# Patient Record
Sex: MALE
[De-identification: names, ages, dates, MRNs, and addresses within clinical notes are randomized per-mention and may not be internally consistent; named-entity substitution may affect disease eponyms.]

---

## 2017-01-24 ENCOUNTER — PRIOR ORIGINAL RECORDS (OUTPATIENT)
Dept: OTHER | Age: 57
End: 2017-01-24

## 2017-01-24 ENCOUNTER — APPOINTMENT (OUTPATIENT)
Dept: GENERAL RADIOLOGY | Facility: HOSPITAL | Age: 57
End: 2017-01-24
Attending: EMERGENCY MEDICINE
Payer: COMMERCIAL

## 2017-01-24 ENCOUNTER — HOSPITAL ENCOUNTER (EMERGENCY)
Facility: HOSPITAL | Age: 57
Discharge: HOME | End: 2017-01-24
Attending: EMERGENCY MEDICINE
Payer: COMMERCIAL

## 2017-01-24 VITALS
DIASTOLIC BLOOD PRESSURE: 59 MMHG | BODY MASS INDEX: 26.68 KG/M2 | SYSTOLIC BLOOD PRESSURE: 102 MMHG | HEART RATE: 70 BPM | OXYGEN SATURATION: 99 % | HEIGHT: 67 IN | WEIGHT: 170 LBS | RESPIRATION RATE: 16 BRPM | TEMPERATURE: 98 F

## 2017-01-24 DIAGNOSIS — I47.1 SVT (SUPRAVENTRICULAR TACHYCARDIA) (HCC): Primary | ICD-10-CM

## 2017-01-24 LAB
ANION GAP SERPL CALC-SCNC: 10 MMOL/L (ref 0–18)
BASOPHILS # BLD: 0 K/UL (ref 0–0.2)
BASOPHILS NFR BLD: 0 %
BUN SERPL-MCNC: 21 MG/DL (ref 8–20)
BUN/CREAT SERPL: 19.3 (ref 10–20)
CALCIUM SERPL-MCNC: 8.8 MG/DL (ref 8.5–10.5)
CHLORIDE SERPL-SCNC: 106 MMOL/L (ref 95–110)
CHOLEST SERPL-MCNC: 216 MG/DL (ref 110–200)
CO2 SERPL-SCNC: 23 MMOL/L (ref 22–32)
CREAT SERPL-MCNC: 1.09 MG/DL (ref 0.5–1.5)
EOSINOPHIL # BLD: 0.4 K/UL (ref 0–0.7)
EOSINOPHIL NFR BLD: 3 %
ERYTHROCYTE [DISTWIDTH] IN BLOOD BY AUTOMATED COUNT: 13.2 % (ref 11–15)
GLUCOSE SERPL-MCNC: 126 MG/DL (ref 70–99)
HCT VFR BLD AUTO: 48 % (ref 41–52)
HDLC SERPL-MCNC: 39 MG/DL
HGB BLD-MCNC: 16 G/DL (ref 13.5–17.5)
LDLC SERPL CALC-MCNC: 117 MG/DL (ref 0–99)
LYMPHOCYTES # BLD: 7.9 K/UL (ref 1–4)
LYMPHOCYTES NFR BLD: 61 %
MCH RBC QN AUTO: 30.2 PG (ref 27–32)
MCHC RBC AUTO-ENTMCNC: 33.3 G/DL (ref 32–37)
MCV RBC AUTO: 90.7 FL (ref 80–100)
MONOCYTES # BLD: 0.5 K/UL (ref 0–1)
MONOCYTES NFR BLD: 4 %
NEUTROPHILS # BLD AUTO: 3.2 K/UL (ref 1.8–7.7)
NEUTROPHILS NFR BLD: 22 %
NEUTS BAND NFR BLD: 5 %
NONHDLC SERPL-MCNC: 177 MG/DL
OSMOLALITY UR CALC.SUM OF ELEC: 293 MOSM/KG (ref 275–295)
PLATELET # BLD AUTO: 232 K/UL (ref 140–400)
PMV BLD AUTO: 10.3 FL (ref 7.4–10.3)
POTASSIUM SERPL-SCNC: 3.9 MMOL/L (ref 3.3–5.1)
RBC # BLD AUTO: 5.29 M/UL (ref 4.5–5.9)
SODIUM SERPL-SCNC: 139 MMOL/L (ref 136–144)
TRIGL SERPL-MCNC: 299 MG/DL (ref 1–149)
TROPONIN I SERPL-MCNC: 0.04 NG/ML (ref ?–0.03)
VARIANT LYMPHS NFR BLD MANUAL: 5 %
WBC # BLD AUTO: 12 K/UL (ref 4–11)

## 2017-01-24 PROCEDURE — 80048 BASIC METABOLIC PNL TOTAL CA: CPT | Performed by: EMERGENCY MEDICINE

## 2017-01-24 PROCEDURE — 71010 XR CHEST AP PORTABLE  (CPT=71010): CPT

## 2017-01-24 PROCEDURE — 85025 COMPLETE CBC W/AUTO DIFF WBC: CPT | Performed by: EMERGENCY MEDICINE

## 2017-01-24 PROCEDURE — 93010 ELECTROCARDIOGRAM REPORT: CPT | Performed by: EMERGENCY MEDICINE

## 2017-01-24 PROCEDURE — 85060 BLOOD SMEAR INTERPRETATION: CPT | Performed by: EMERGENCY MEDICINE

## 2017-01-24 PROCEDURE — 96374 THER/PROPH/DIAG INJ IV PUSH: CPT

## 2017-01-24 PROCEDURE — 99285 EMERGENCY DEPT VISIT HI MDM: CPT

## 2017-01-24 PROCEDURE — 93005 ELECTROCARDIOGRAM TRACING: CPT

## 2017-01-24 PROCEDURE — 84484 ASSAY OF TROPONIN QUANT: CPT | Performed by: EMERGENCY MEDICINE

## 2017-01-24 PROCEDURE — 80061 LIPID PANEL: CPT | Performed by: EMERGENCY MEDICINE

## 2017-01-24 RX ORDER — ADENOSINE 3 MG/ML
INJECTION, SOLUTION INTRAVENOUS
Status: DISCONTINUED
Start: 2017-01-24 | End: 2017-01-24 | Stop reason: WASHOUT

## 2017-01-24 RX ORDER — ADENOSINE 3 MG/ML
6 INJECTION, SOLUTION INTRAVENOUS ONCE
Status: DISCONTINUED | OUTPATIENT
Start: 2017-01-24 | End: 2017-01-24

## 2017-01-24 RX ORDER — ADENOSINE 3 MG/ML
INJECTION, SOLUTION INTRAVENOUS
Status: DISCONTINUED
Start: 2017-01-24 | End: 2017-01-24

## 2017-01-24 RX ORDER — ADENOSINE 3 MG/ML
6 INJECTION, SOLUTION INTRAVENOUS ONCE
Status: COMPLETED | OUTPATIENT
Start: 2017-01-24 | End: 2017-01-24

## 2017-01-24 NOTE — ED PROVIDER NOTES
Patient Seen in: [facility] AND [facility] Emergency Department    History   Patient presents with:  Chest Pressure      HPI    The patient presents with his family complaining of chest pressure and palpitations starting at 7:30 PM tonight.   Patient states he ha and negative except as noted above. PSFH elements reviewed from today and agreed except as otherwise stated in HPI.     Physical Exam       ED Triage Vitals   BP 01/24/17 0257 131/88 mmHg   Pulse 01/24/17 0257 168   Resp 01/24/17 0257 20   Temp 01/24/17 PLATELET.   Procedure                               Abnormality         Status                     ---------                               -----------         ------                     CBC W/ DIFFERENTIAL[[id number]]          Abnormal            Preliminary caregiver.       Disposition and Plan     Clinical Impression:  SVT (supraventricular tachycardia) (HCC)  (primary encounter diagnosis)    Disposition:  Discharge    Follow-up:  Cristi Duke MD  [address] [facility] [address] [phone]    [place]

## 2017-03-13 ENCOUNTER — TELEPHONE (OUTPATIENT)
Dept: CARDIOLOGY CLINIC | Facility: CLINIC | Age: 57
End: 2017-03-13

## 2017-03-13 NOTE — TELEPHONE ENCOUNTER
Pt was scheduled to see Dr. Dennis Elder on Wednesday.  Pt was also advised he may need to make appt with Dr. Lashawn Gutierrez, per ER discharge

## 2017-03-13 NOTE — TELEPHONE ENCOUNTER
Pt daughter called. Pt was seen in ER 3 weeks ago for heart palpitations and had another episode of  heart palpitations last week. Pt daughter asked if pt can be sooner then 04/17.   Please call thank you

## 2017-03-14 NOTE — TELEPHONE ENCOUNTER
S/w [facility] office and pt was made an appt with Dr. Gabi Killian today, but daughter states pt is unable to make appt. Pt would prefer to to see Dr. Kenya Eldridge. Daughter made an appt to see Dr. Kenya Eldridge next week.

## 2017-03-20 LAB
CALCIUM: 8.8 MG/DL
CHLORIDE: 106 MEQ/L
CHOLESTEROL, TOTAL: 216 MG/DL
CREATININE, SERUM: 1.09 MG/DL
GLUCOSE: 126 MG/DL
GLUCOSE: 126 MG/DL
HDL CHOLESTEROL: 39 MG/DL
HEMATOCRIT: 48 %
HEMOGLOBIN: 16 G/DL
LDL CHOLESTEROL: 117 MG/DL
MCH: 30.2 PG
MCHC: 33.3 G/DL
MCV: 90.7 FL
NON-HDL CHOLESTEROL: 177 MG/DL
PLATELETS: 232 K/UL
POTASSIUM, SERUM: 3.9 MEQ/L
RED BLOOD COUNT: 5.29 X 10-6/U
SODIUM: 139 MEQ/L
TRIGLYCERIDES: 299 MG/DL
URIC ACID: 21 MG/DL
WHITE BLOOD COUNT: 12 X 10-3/U

## 2017-03-21 ENCOUNTER — MYAURORA ACCOUNT LINK (OUTPATIENT)
Dept: OTHER | Age: 57
End: 2017-03-21

## 2017-03-21 ENCOUNTER — PRIOR ORIGINAL RECORDS (OUTPATIENT)
Dept: OTHER | Age: 57
End: 2017-03-21

## 2017-04-24 PROBLEM — E78.5 DYSLIPIDEMIA: Status: ACTIVE | Noted: 2017-04-24

## 2017-04-24 PROBLEM — I47.10 PSVT (PAROXYSMAL SUPRAVENTRICULAR TACHYCARDIA) (HCC): Status: ACTIVE | Noted: 2017-04-24

## 2017-04-24 PROBLEM — I47.10 PSVT (PAROXYSMAL SUPRAVENTRICULAR TACHYCARDIA): Status: ACTIVE | Noted: 2017-04-24

## 2017-04-24 PROBLEM — I47.1 PSVT (PAROXYSMAL SUPRAVENTRICULAR TACHYCARDIA) (HCC): Status: ACTIVE | Noted: 2017-04-24

## 2017-08-04 ENCOUNTER — HOSPITAL ENCOUNTER (EMERGENCY)
Facility: HOSPITAL | Age: 57
Discharge: HOME | End: 2017-08-04
Attending: EMERGENCY MEDICINE
Payer: COMMERCIAL

## 2017-08-04 VITALS
SYSTOLIC BLOOD PRESSURE: 108 MMHG | WEIGHT: 170 LBS | DIASTOLIC BLOOD PRESSURE: 73 MMHG | BODY MASS INDEX: 24.34 KG/M2 | HEART RATE: 62 BPM | HEIGHT: 70 IN | RESPIRATION RATE: 16 BRPM | OXYGEN SATURATION: 98 % | TEMPERATURE: 99 F

## 2017-08-04 DIAGNOSIS — I47.1 PSVT (PAROXYSMAL SUPRAVENTRICULAR TACHYCARDIA) (HCC): Primary | ICD-10-CM

## 2017-08-04 LAB
ANION GAP SERPL CALC-SCNC: 10 MMOL/L (ref 0–18)
BASOPHILS # BLD: 0.1 K/UL (ref 0–0.2)
BASOPHILS NFR BLD: 1 %
BUN SERPL-MCNC: 20 MG/DL (ref 8–20)
BUN/CREAT SERPL: 18.7 (ref 10–20)
CALCIUM SERPL-MCNC: 9.1 MG/DL (ref 8.5–10.5)
CHLORIDE SERPL-SCNC: 103 MMOL/L (ref 95–110)
CO2 SERPL-SCNC: 27 MMOL/L (ref 22–32)
CREAT SERPL-MCNC: 1.07 MG/DL (ref 0.5–1.5)
EOSINOPHIL # BLD: 0.2 K/UL (ref 0–0.7)
EOSINOPHIL NFR BLD: 1 %
ERYTHROCYTE [DISTWIDTH] IN BLOOD BY AUTOMATED COUNT: 13.2 % (ref 11–15)
GLUCOSE SERPL-MCNC: 113 MG/DL (ref 70–99)
HCT VFR BLD AUTO: 50.4 % (ref 41–52)
HGB BLD-MCNC: 16.9 G/DL (ref 13.5–17.5)
LYMPHOCYTES # BLD: 6.3 K/UL (ref 1–4)
LYMPHOCYTES NFR BLD: 51 %
MAGNESIUM SERPL-MCNC: 2.2 MG/DL (ref 1.8–2.5)
MCH RBC QN AUTO: 30.3 PG (ref 27–32)
MCHC RBC AUTO-ENTMCNC: 33.5 G/DL (ref 32–37)
MCV RBC AUTO: 90.2 FL (ref 80–100)
MONOCYTES # BLD: 0.9 K/UL (ref 0–1)
MONOCYTES NFR BLD: 7 %
NEUTROPHILS # BLD AUTO: 4.9 K/UL (ref 1.8–7.7)
NEUTROPHILS NFR BLD: 40 %
OSMOLALITY UR CALC.SUM OF ELEC: 293 MOSM/KG (ref 275–295)
PLATELET # BLD AUTO: 257 K/UL (ref 140–400)
PMV BLD AUTO: 9.3 FL (ref 7.4–10.3)
POTASSIUM SERPL-SCNC: 3.7 MMOL/L (ref 3.3–5.1)
RBC # BLD AUTO: 5.58 M/UL (ref 4.5–5.9)
SODIUM SERPL-SCNC: 140 MMOL/L (ref 136–144)
TROPONIN I SERPL-MCNC: 0.03 NG/ML (ref ?–0.03)
WBC # BLD AUTO: 12.3 K/UL (ref 4–11)

## 2017-08-04 PROCEDURE — 83735 ASSAY OF MAGNESIUM: CPT | Performed by: EMERGENCY MEDICINE

## 2017-08-04 PROCEDURE — 93010 ELECTROCARDIOGRAM REPORT: CPT | Performed by: EMERGENCY MEDICINE

## 2017-08-04 PROCEDURE — 80048 BASIC METABOLIC PNL TOTAL CA: CPT | Performed by: EMERGENCY MEDICINE

## 2017-08-04 PROCEDURE — 93005 ELECTROCARDIOGRAM TRACING: CPT

## 2017-08-04 PROCEDURE — 99284 EMERGENCY DEPT VISIT MOD MDM: CPT

## 2017-08-04 PROCEDURE — 85025 COMPLETE CBC W/AUTO DIFF WBC: CPT | Performed by: EMERGENCY MEDICINE

## 2017-08-04 PROCEDURE — 84484 ASSAY OF TROPONIN QUANT: CPT | Performed by: EMERGENCY MEDICINE

## 2017-08-04 PROCEDURE — 96374 THER/PROPH/DIAG INJ IV PUSH: CPT

## 2017-08-04 PROCEDURE — 85007 BL SMEAR W/DIFF WBC COUNT: CPT | Performed by: EMERGENCY MEDICINE

## 2017-08-04 PROCEDURE — 85060 BLOOD SMEAR INTERPRETATION: CPT | Performed by: EMERGENCY MEDICINE

## 2017-08-04 PROCEDURE — 85027 COMPLETE CBC AUTOMATED: CPT | Performed by: EMERGENCY MEDICINE

## 2017-08-04 RX ORDER — DILTIAZEM HYDROCHLORIDE 120 MG/1
120 CAPSULE, COATED, EXTENDED RELEASE ORAL DAILY
Qty: 14 CAPSULE | Refills: 0 | Status: SHIPPED | OUTPATIENT
Start: 2017-08-04 | End: 2017-08-18

## 2017-08-04 RX ORDER — ADENOSINE 3 MG/ML
INJECTION, SOLUTION INTRAVENOUS
Status: COMPLETED
Start: 2017-08-04 | End: 2017-08-04

## 2017-08-04 RX ORDER — ADENOSINE 3 MG/ML
6 INJECTION, SOLUTION INTRAVENOUS ONCE
Status: COMPLETED | OUTPATIENT
Start: 2017-08-04 | End: 2017-08-04

## 2017-08-04 NOTE — ED NOTES
Pt resting comfortably to cart with family at bedside. Denies any complaints of pain presently. Pt in sinus rhythm. See VITALS please. No concerns. Remains to monitor.

## 2017-08-04 NOTE — ED PROVIDER NOTES
Patient Seen in: [facility] AND [facility] Emergency Department    History   Patient presents with:  Chest Pain Angina (cardiovascular)    Stated Complaint: chest pain    HPI    79-year-old male presents for evaluation of palpitations.   Patient reports palpitati Alcohol use: No               Comment: 1 beer, monthly      Review of Systems    Positive for stated complaint: chest pain  Other systems are as noted in HPI. Constitutional and vital signs reviewed.       All other systems reviewed and negative except a TROPONIN I, 0 HOUR - Normal   MAGNESIUM - Normal   CBC WITH DIFFERENTIAL WITH PLATELET    Narrative: The following orders were created for panel order CBC WITH DIFFERENTIAL WITH PLATELET.   Procedure                               Abnormality         S up      Medications Prescribed:  Discharge Medication List as of 8/4/2017  3:57 AM    START taking these medications    !! DilTIAZem HCl ER Coated Beads (CARDIZEM CD) 120 MG Oral Capsule SR 24 Hr  Take 1 capsule (120 mg total) by mouth daily. , Normal, Disp

## 2017-08-04 NOTE — ED NOTES
Presents to ED in SVT with hr of 150's states felt palpitations today. Hx of svt. 6mg of adenocard given, patient converted into nsr with a rate of 80-90's. States he feels much better. Otherwise alert oriented without deficit.

## 2017-11-18 ENCOUNTER — LAB ENCOUNTER (OUTPATIENT)
Dept: LAB | Facility: HOSPITAL | Age: 57
End: 2017-11-18
Attending: INTERNAL MEDICINE
Payer: COMMERCIAL

## 2017-11-18 DIAGNOSIS — I47.1 PSVT (PAROXYSMAL SUPRAVENTRICULAR TACHYCARDIA) (HCC): ICD-10-CM

## 2017-11-18 DIAGNOSIS — Z01.812 PRE-PROCEDURE LAB EXAM: ICD-10-CM

## 2017-11-18 PROCEDURE — 80048 BASIC METABOLIC PNL TOTAL CA: CPT

## 2017-11-18 PROCEDURE — 36415 COLL VENOUS BLD VENIPUNCTURE: CPT

## 2017-11-18 PROCEDURE — 85025 COMPLETE CBC W/AUTO DIFF WBC: CPT

## 2017-11-24 RX ORDER — SODIUM CHLORIDE 9 MG/ML
INJECTION, SOLUTION INTRAVENOUS ONCE
Status: COMPLETED | OUTPATIENT
Start: 2017-11-27 | End: 2017-11-27

## 2017-11-27 ENCOUNTER — HOSPITAL ENCOUNTER (OUTPATIENT)
Dept: INTERVENTIONAL RADIOLOGY/VASCULAR | Facility: HOSPITAL | Age: 57
Discharge: HOME | End: 2017-11-27
Attending: INTERNAL MEDICINE | Admitting: INTERNAL MEDICINE
Payer: COMMERCIAL

## 2017-11-27 VITALS
OXYGEN SATURATION: 99 % | HEART RATE: 62 BPM | WEIGHT: 181 LBS | BODY MASS INDEX: 28 KG/M2 | RESPIRATION RATE: 16 BRPM | DIASTOLIC BLOOD PRESSURE: 70 MMHG | SYSTOLIC BLOOD PRESSURE: 104 MMHG

## 2017-11-27 DIAGNOSIS — I47.1 SVT (SUPRAVENTRICULAR TACHYCARDIA) (HCC): ICD-10-CM

## 2017-11-27 PROCEDURE — 93623 PRGRMD STIMJ&PACG IV RX NFS: CPT

## 2017-11-27 PROCEDURE — 99152 MOD SED SAME PHYS/QHP 5/>YRS: CPT

## 2017-11-27 PROCEDURE — 93005 ELECTROCARDIOGRAM TRACING: CPT

## 2017-11-27 PROCEDURE — 93010 ELECTROCARDIOGRAM REPORT: CPT | Performed by: INTERNAL MEDICINE

## 2017-11-27 PROCEDURE — 02583ZZ DESTRUCTION OF CONDUCTION MECHANISM, PERCUTANEOUS APPROACH: ICD-10-PCS | Performed by: INTERNAL MEDICINE

## 2017-11-27 PROCEDURE — 99153 MOD SED SAME PHYS/QHP EA: CPT

## 2017-11-27 PROCEDURE — 93653 COMPRE EP EVAL TX SVT: CPT

## 2017-11-27 PROCEDURE — 02K83ZZ MAP CONDUCTION MECHANISM, PERCUTANEOUS APPROACH: ICD-10-PCS | Performed by: INTERNAL MEDICINE

## 2017-11-27 PROCEDURE — 93621 COMP EP EVL L PAC&REC C SINS: CPT

## 2017-11-27 PROCEDURE — 93609 INTRA-VNTR MAPG TCHYCAR SITE: CPT

## 2017-11-27 RX ORDER — SODIUM CHLORIDE 9 MG/ML
INJECTION, SOLUTION INTRAVENOUS
Status: COMPLETED
Start: 2017-11-27 | End: 2017-11-27

## 2017-11-27 RX ORDER — ISOPROTERENOL HYDROCHLORIDE 0.2 MG/ML
INJECTION, SOLUTION INTRAMUSCULAR; INTRAVENOUS
Status: COMPLETED
Start: 2017-11-27 | End: 2017-11-27

## 2017-11-27 RX ORDER — MIDAZOLAM HYDROCHLORIDE 1 MG/ML
INJECTION INTRAMUSCULAR; INTRAVENOUS
Status: COMPLETED
Start: 2017-11-27 | End: 2017-11-27

## 2017-11-27 RX ORDER — DIPHENHYDRAMINE HYDROCHLORIDE 50 MG/ML
INJECTION INTRAMUSCULAR; INTRAVENOUS
Status: COMPLETED
Start: 2017-11-27 | End: 2017-11-27

## 2017-11-27 RX ORDER — LIDOCAINE HYDROCHLORIDE 20 MG/ML
INJECTION, SOLUTION EPIDURAL; INFILTRATION; INTRACAUDAL; PERINEURAL
Status: COMPLETED
Start: 2017-11-27 | End: 2017-11-27

## 2017-11-27 RX ORDER — SODIUM CHLORIDE 9 MG/ML
INJECTION, SOLUTION INTRAVENOUS
Status: DISCONTINUED
Start: 2017-11-27 | End: 2017-11-27

## 2017-11-27 RX ADMIN — SODIUM CHLORIDE: 9 INJECTION, SOLUTION INTRAVENOUS at 12:45:00

## 2017-11-27 NOTE — PROCEDURES
[facility]    PATIENT'S NAME: Kierra Montez   ATTENDING PHYSICIAN: Esteban John. Devon Richardson MD   OPERATING PHYSICIAN: Esteban John.  Devon Richardson MD   PATIENT ACCOUNT#:   [id number]    LOCATION:  [place] [address]  MEDICAL RECORD #:   [id number]       D conduction was 370. It was concentric with decremental properties. No evidence of a left-sided pathway. TACHYCARDIA DATA:  Tachycardia was extremely difficult to be induced.   We had one 5-second run of SVT and one 4-beat run of SVT, both consistent wi

## 2017-11-28 NOTE — PROGRESS NOTES
Adelina Francisco  [id number]  11/27/2017    Pt is able to sit up and ambulate without difficulty. Pt voided and tolerated fluids/food. Pt's vital signs are stable. Procedural sites remain dry and intact with good circulation, motion and sensation.  No sign

## 2018-04-06 ENCOUNTER — OFFICE VISIT (OUTPATIENT)
Dept: INTERNAL MEDICINE CLINIC | Facility: CLINIC | Age: 58
End: 2018-04-06

## 2018-04-06 VITALS
BODY MASS INDEX: 27.6 KG/M2 | SYSTOLIC BLOOD PRESSURE: 121 MMHG | HEIGHT: 67.75 IN | WEIGHT: 180 LBS | TEMPERATURE: 98 F | HEART RATE: 62 BPM | DIASTOLIC BLOOD PRESSURE: 67 MMHG | RESPIRATION RATE: 18 BRPM

## 2018-04-06 DIAGNOSIS — Z00.00 PHYSICAL EXAM, ANNUAL: Primary | ICD-10-CM

## 2018-04-06 DIAGNOSIS — Z12.5 PROSTATE CANCER SCREENING: ICD-10-CM

## 2018-04-06 PROCEDURE — 99396 PREV VISIT EST AGE 40-64: CPT | Performed by: INTERNAL MEDICINE

## 2018-04-06 RX ORDER — CODEINE PHOSPHATE AND GUAIFENESIN 10; 100 MG/5ML; MG/5ML
5 SOLUTION ORAL EVERY 6 HOURS PRN
Qty: 180 ML | Refills: 0 | Status: SHIPPED | OUTPATIENT
Start: 2018-04-06 | End: 2018-07-12

## 2018-04-06 RX ORDER — CEFDINIR 300 MG/1
300 CAPSULE ORAL 2 TIMES DAILY
Qty: 14 CAPSULE | Refills: 0 | Status: SHIPPED | OUTPATIENT
Start: 2018-04-06 | End: 2018-07-12

## 2018-04-08 NOTE — PROGRESS NOTES
HPI:    Patient ID: Jaden Gibson is a 61year old male. Presents for  a physical exam.    HPI  Patient reports that last 2 weeks he has been bothered by dry cough, nasal congestion, cough keeps him awake at night.   He says that his symptoms he is fee m)   Wt 180 lb (81.6 kg)   BMI 27.57 kg/m²    Physical Exam    Constitutional: He is oriented to person, place, and time. He appears well-developed and well-nourished. No distress. HENT:   Head: Normocephalic and atraumatic.    Mouth/Throat: Oropharynx is

## 2018-05-08 ENCOUNTER — TELEPHONE (OUTPATIENT)
Dept: OTHER | Age: 58
End: 2018-05-08

## 2018-05-08 DIAGNOSIS — R79.89 ABNORMAL CBC: Primary | ICD-10-CM

## 2018-05-08 NOTE — TELEPHONE ENCOUNTER
Spoke with patient (identified name and ), results reviewed and agrees with plan.   Lab ordered and mailed to home as requested    Notes recorded by Kwan Cantu MD on 2018 at 10:15 PM CDT  Please call patient that blood count shows elevated lymph

## 2018-11-23 ENCOUNTER — NURSE TRIAGE (OUTPATIENT)
Dept: INTERNAL MEDICINE CLINIC | Facility: CLINIC | Age: 58
End: 2018-11-23

## 2018-11-23 NOTE — TELEPHONE ENCOUNTER
Action Requested: Summary for Provider     []  Critical Lab, Recommendations Needed  [] Need Additional Advice  [x]   FYI    []   Need Orders  [] Need Medications Sent to Pharmacy  []  Other     SUMMARY: Appt scheduled for 11/26/18 3:40pm with cliff Bocanegra o

## 2018-11-23 NOTE — TELEPHONE ENCOUNTER
Please call pt I adivse that he  Goes to IC  For eval, jose  May become incarcerated and  requieres    emergency surgery

## 2018-11-24 NOTE — TELEPHONE ENCOUNTER
Irina returned call, did not seek tx, pain is improved today. States pain is in groin area, not in penis or scrotum. Gets worse with straining or exertiion. Deos not wish to go to IC at this time.   Will keep appt as scheduled Monday, will report to I

## 2018-11-26 ENCOUNTER — HOSPITAL ENCOUNTER (OUTPATIENT)
Dept: CT IMAGING | Facility: HOSPITAL | Age: 58
Discharge: HOME | End: 2018-11-26
Attending: INTERNAL MEDICINE
Payer: COMMERCIAL

## 2018-11-26 ENCOUNTER — TELEPHONE (OUTPATIENT)
Dept: INTERNAL MEDICINE CLINIC | Facility: CLINIC | Age: 58
End: 2018-11-26

## 2018-11-26 ENCOUNTER — TELEPHONE (OUTPATIENT)
Dept: OTHER | Age: 58
End: 2018-11-26

## 2018-11-26 ENCOUNTER — OFFICE VISIT (OUTPATIENT)
Dept: INTERNAL MEDICINE CLINIC | Facility: CLINIC | Age: 58
End: 2018-11-26
Payer: OTHER MISCELLANEOUS

## 2018-11-26 VITALS
BODY MASS INDEX: 29 KG/M2 | SYSTOLIC BLOOD PRESSURE: 104 MMHG | RESPIRATION RATE: 18 BRPM | HEART RATE: 73 BPM | WEIGHT: 182 LBS | DIASTOLIC BLOOD PRESSURE: 62 MMHG

## 2018-11-26 DIAGNOSIS — R10.31 RIGHT LOWER QUADRANT ABDOMINAL PAIN: Primary | ICD-10-CM

## 2018-11-26 DIAGNOSIS — R10.31 RIGHT LOWER QUADRANT ABDOMINAL PAIN: ICD-10-CM

## 2018-11-26 DIAGNOSIS — S39.012A LUMBAR STRAIN, INITIAL ENCOUNTER: ICD-10-CM

## 2018-11-26 PROCEDURE — 99214 OFFICE O/P EST MOD 30 MIN: CPT | Performed by: INTERNAL MEDICINE

## 2018-11-26 PROCEDURE — 99213 OFFICE O/P EST LOW 20 MIN: CPT | Performed by: INTERNAL MEDICINE

## 2018-11-26 PROCEDURE — 81003 URINALYSIS AUTO W/O SCOPE: CPT | Performed by: INTERNAL MEDICINE

## 2018-11-26 PROCEDURE — 74177 CT ABD & PELVIS W/CONTRAST: CPT | Performed by: INTERNAL MEDICINE

## 2018-11-26 PROCEDURE — 82565 ASSAY OF CREATININE: CPT

## 2018-11-26 RX ORDER — IBUPROFEN 600 MG/1
600 TABLET ORAL EVERY 8 HOURS PRN
Qty: 60 TABLET | Refills: 0 | Status: SHIPPED | OUTPATIENT
Start: 2018-11-26 | End: 2020-06-18

## 2018-11-26 RX ORDER — CYCLOBENZAPRINE HCL 5 MG
5 TABLET ORAL 3 TIMES DAILY PRN
Qty: 30 TABLET | Refills: 0 | Status: SHIPPED | OUTPATIENT
Start: 2018-11-26 | End: 2020-06-18

## 2018-11-26 NOTE — TELEPHONE ENCOUNTER
Per Dr. Brad Mckeon request, contacted [facility] for PA information for STAT CT ordered at today's . Contacted [facility], was informed no PA needed for procedure. Reviewed this information with Jorge Jacques at [facility] she will inform Dr. Delma Acevedo.

## 2018-11-26 NOTE — PROGRESS NOTES
HPI:    Patient ID: Renny Mera is a 60year old male.   Presents for evaluation of the right lower quadrant abdominal pain    HPI  About 3 weeks ago developed right low abdominal pain radiating to the right, pain is constant, not related to bowel mov no wheezes. Abdominal: Soft. Bowel sounds are normal. There is no hepatosplenomegaly. There is tenderness in the right lower quadrant. There is no rigidity, no guarding and no CVA tenderness. No hernia. Musculoskeletal: Normal range of motion.    Neurol

## 2018-11-27 ENCOUNTER — TELEPHONE (OUTPATIENT)
Dept: INTERNAL MEDICINE CLINIC | Facility: CLINIC | Age: 58
End: 2018-11-27

## 2018-11-27 NOTE — TELEPHONE ENCOUNTER
Called pt. Left message. Patient requested work note, need to know if patient would like to  ,fax or mail work note . Can transfer to [address] .

## 2018-11-27 NOTE — TELEPHONE ENCOUNTER
Patient notified about CT scan results, no signs of appendicitis, kidney stone, right-sided hernia, nonspecific enlarged lymph nodes that need follow-up CT scan in 6 months, small left inguinal hernia, thickened urinary bladder recommended to see urologist

## 2019-03-01 VITALS
OXYGEN SATURATION: 100 % | HEIGHT: 68 IN | DIASTOLIC BLOOD PRESSURE: 82 MMHG | SYSTOLIC BLOOD PRESSURE: 110 MMHG | BODY MASS INDEX: 26.83 KG/M2 | RESPIRATION RATE: 8 BRPM | WEIGHT: 177 LBS | HEART RATE: 52 BPM

## 2020-06-18 ENCOUNTER — APPOINTMENT (OUTPATIENT)
Dept: LAB | Age: 60
End: 2020-06-18
Attending: FAMILY MEDICINE
Payer: COMMERCIAL

## 2020-06-18 ENCOUNTER — OFFICE VISIT (OUTPATIENT)
Dept: FAMILY MEDICINE CLINIC | Facility: CLINIC | Age: 60
End: 2020-06-18
Payer: COMMERCIAL

## 2020-06-18 VITALS
HEART RATE: 64 BPM | SYSTOLIC BLOOD PRESSURE: 121 MMHG | HEIGHT: 69 IN | BODY MASS INDEX: 26.69 KG/M2 | TEMPERATURE: 98 F | WEIGHT: 180.19 LBS | DIASTOLIC BLOOD PRESSURE: 83 MMHG

## 2020-06-18 DIAGNOSIS — N52.9 ERECTILE DYSFUNCTION, UNSPECIFIED ERECTILE DYSFUNCTION TYPE: ICD-10-CM

## 2020-06-18 DIAGNOSIS — R53.83 FATIGUE, UNSPECIFIED TYPE: ICD-10-CM

## 2020-06-18 DIAGNOSIS — I47.1 SVT (SUPRAVENTRICULAR TACHYCARDIA) (HCC): ICD-10-CM

## 2020-06-18 DIAGNOSIS — R61 NIGHT SWEATS: ICD-10-CM

## 2020-06-18 DIAGNOSIS — R61 NIGHT SWEATS: Primary | ICD-10-CM

## 2020-06-18 PROCEDURE — 84153 ASSAY OF PSA TOTAL: CPT

## 2020-06-18 PROCEDURE — 84443 ASSAY THYROID STIM HORMONE: CPT

## 2020-06-18 PROCEDURE — 86480 TB TEST CELL IMMUN MEASURE: CPT

## 2020-06-18 PROCEDURE — 84402 ASSAY OF FREE TESTOSTERONE: CPT | Performed by: FAMILY MEDICINE

## 2020-06-18 PROCEDURE — 84403 ASSAY OF TOTAL TESTOSTERONE: CPT | Performed by: FAMILY MEDICINE

## 2020-06-18 PROCEDURE — 93005 ELECTROCARDIOGRAM TRACING: CPT

## 2020-06-18 PROCEDURE — 93010 ELECTROCARDIOGRAM REPORT: CPT | Performed by: FAMILY MEDICINE

## 2020-06-18 PROCEDURE — 36415 COLL VENOUS BLD VENIPUNCTURE: CPT

## 2020-06-18 PROCEDURE — 99204 OFFICE O/P NEW MOD 45 MIN: CPT | Performed by: FAMILY MEDICINE

## 2020-06-18 RX ORDER — VARDENAFIL HYDROCHLORIDE 5 MG/1
5 TABLET ORAL AS NEEDED
Qty: 9 TABLET | Refills: 0 | Status: SHIPPED | OUTPATIENT
Start: 2020-06-18 | End: 2020-06-29

## 2020-06-18 RX ORDER — CYANOCOBALAMIN 1000 UG/ML
1000 INJECTION INTRAMUSCULAR; SUBCUTANEOUS ONCE
Status: DISCONTINUED | OUTPATIENT
Start: 2020-06-18 | End: 2020-06-29

## 2020-06-18 NOTE — PROGRESS NOTES
6/18/2020  2:27 PM    Cristóbal Bahena is a 60year old male. Chief complaint(s): Patient presents with:  Night Sweats: night sweats 1-2 x's per week   Fatigue    HPI:     Cristóbal Bahena primary complaint is regarding multiple complaints.      Sweta Oral Tab Take 1 tablet (5 mg total) by mouth as needed for Erectile Dysfunction. 9 tablet 0       Allergies:  No Known Allergies      ROS:   Review of Systems   Constitutional: Positive for fatigue. Negative for chills and fever.    Respiratory: Negative fo to Free [E]      TSH W Reflex To Free T4 [E]      Quantiferon TB [E]    REFERRALS: CARDIO - EXTERNAL, Orders Placed This Encounter         RECOMMENDATIONS given include: Please, call our office with any questions or concerns.  Notify Dr Pat Moy or the [facility] Visit:  Orders Placed This Encounter      Testosterone,Free And Total [E]      PSA, Total with Reflex to Free [E]      TSH W Reflex To Free T4 [E]      Quantiferon TB [E]      Meds This Visit:  Requested Prescriptions     Signed Prescriptions Disp Refills

## 2020-06-19 ENCOUNTER — TELEPHONE (OUTPATIENT)
Dept: FAMILY MEDICINE CLINIC | Facility: CLINIC | Age: 60
End: 2020-06-19

## 2020-06-19 NOTE — TELEPHONE ENCOUNTER
Vardenafil hcl 5mg tablet was approved through [employer] 5/19/2020 to 6/18/2021.   Case [id number]

## 2020-06-29 ENCOUNTER — OFFICE VISIT (OUTPATIENT)
Dept: FAMILY MEDICINE CLINIC | Facility: CLINIC | Age: 60
End: 2020-06-29
Payer: COMMERCIAL

## 2020-06-29 VITALS
WEIGHT: 177.19 LBS | TEMPERATURE: 98 F | BODY MASS INDEX: 26.24 KG/M2 | HEART RATE: 69 BPM | SYSTOLIC BLOOD PRESSURE: 108 MMHG | HEIGHT: 69 IN | DIASTOLIC BLOOD PRESSURE: 75 MMHG

## 2020-06-29 DIAGNOSIS — N40.0 BENIGN PROSTATIC HYPERPLASIA WITHOUT LOWER URINARY TRACT SYMPTOMS: ICD-10-CM

## 2020-06-29 DIAGNOSIS — Z00.00 PHYSICAL EXAM: Primary | ICD-10-CM

## 2020-06-29 PROCEDURE — 90715 TDAP VACCINE 7 YRS/> IM: CPT | Performed by: FAMILY MEDICINE

## 2020-06-29 PROCEDURE — 99396 PREV VISIT EST AGE 40-64: CPT | Performed by: FAMILY MEDICINE

## 2020-06-29 PROCEDURE — 90471 IMMUNIZATION ADMIN: CPT | Performed by: FAMILY MEDICINE

## 2020-06-29 PROCEDURE — 96372 THER/PROPH/DIAG INJ SC/IM: CPT | Performed by: FAMILY MEDICINE

## 2020-06-29 RX ORDER — TAMSULOSIN HYDROCHLORIDE 0.4 MG/1
0.4 CAPSULE ORAL DAILY
Qty: 90 CAPSULE | Refills: 2 | Status: SHIPPED | OUTPATIENT
Start: 2020-06-29 | End: 2020-07-29

## 2020-06-29 RX ORDER — ELECTROLYTES/DEXTROSE
1 SOLUTION, ORAL ORAL DAILY
Qty: 100 TABLET | Refills: 3 | Status: SHIPPED | OUTPATIENT
Start: 2020-06-29

## 2020-06-29 RX ORDER — CYANOCOBALAMIN 1000 UG/ML
1000 INJECTION INTRAMUSCULAR; SUBCUTANEOUS ONCE
Status: COMPLETED | OUTPATIENT
Start: 2020-06-29 | End: 2020-06-29

## 2020-06-29 RX ADMIN — CYANOCOBALAMIN 1000 MCG: 1000 INJECTION INTRAMUSCULAR; SUBCUTANEOUS at 17:43:00

## 2020-06-29 NOTE — PROGRESS NOTES
6/29/2020  5:15 PM    Salty is a 60year old male. Chief complaint(s): Patient presents with:  Routine Physical    HPI:     Salty primary complaint is regarding CPE.      Salty is a 60year old male is here for routine and fever. HENT: Negative for hearing loss and tinnitus. Eyes: Negative for visual disturbance. Respiratory: Negative for apnea, shortness of breath and wheezing. Cardiovascular: Negative for chest pain and palpitations.    Gastrointestinal: Negat no tenderness. Hernia confirmed negative in the ventral area, confirmed negative in the right inguinal area and confirmed negative in the left inguinal area. Genitourinary:    Penis and rectum normal.   Rectum:      Guaiac result negative.    Prostate is Urinalysis, Routine [E]      Urine Microscopic w Reflex CULTURE      Vitamin D, 25-Hydroxy [E]      TETANUS, DIPHTHERIA TOXOIDS AND ACELLULAR PERTUSIS VACCINE (TDAP), >7 YEARS, IM USE   (CBC, Complete Metabolic Prophyl with GFR, HgbA1c, Lipid Prophyl, PSA, Refills   • Multiple Vitamins-Minerals (MULTIVITAMIN ADULT) Oral Tab 100 tablet 3     Sig: Take 1 tablet by mouth daily. • tamsulosin (FLOMAX) cap 90 capsule 2     Sig: Take 1 capsule (0.4 mg total) by mouth daily.        Imaging & Referrals:  TETANUS, DI

## 2020-07-16 LAB
CHOL/HDLC RATIO: 5.4 (CALC)
CHOLESTEROL, TOTAL: 243 MG/DL
HDL CHOLESTEROL: 45 MG/DL
LDL-CHOLESTEROL: 172 MG/DL (CALC)
NON-HDL CHOLESTEROL: 198 MG/DL (CALC)
TRIGLYCERIDES: 124 MG/DL

## 2020-07-21 NOTE — PROGRESS NOTES
Pt returned call,  ID [id number], pt verbalized understanding of MD note. Pt advised to disregard certified letter sent. At this time pt stts he will call back for an appt, stts he needs to look at his work schedule.   RN advised virtual vi

## 2021-09-28 ENCOUNTER — TELEPHONE (OUTPATIENT)
Dept: FAMILY MEDICINE CLINIC | Facility: CLINIC | Age: 61
End: 2021-09-28

## 2021-09-28 NOTE — TELEPHONE ENCOUNTER
Left patient's daughter Lianna Rodgers a detailed message stating Jose Serna doesn't order labs prior to appointments he will order labs at the time of the appointment.  I have asked Lianna Rodgers to call back to confirm she received my message

## 2021-09-28 NOTE — TELEPHONE ENCOUNTER
Patient's daughter, Jailyn Montes De Oca, scheduled an annual physical for patient with Dr. Zaid Rodriguez for 10/05/2021.  Daughter is requesting lab orders/ A1C/ prostate check to be completed at [facility] prior to scheduled physical. Daughter is requesting that orders

## 2021-09-28 NOTE — TELEPHONE ENCOUNTER
Patient's daughter, Erick Montero, is requesting that nurse of Dr. Lanre Bradley contact patient due to patient going to non [place] urgent care for torn ACL. Erick Montero states patient would like to discuss second opinion to see who Dr. Lanre Bradley recommends patient to see for his torn ACL. Erick Montero declined to schedule urgent care follow up at this time due to patient coming in for physical 10/05/2021.

## 2021-10-05 ENCOUNTER — OFFICE VISIT (OUTPATIENT)
Dept: FAMILY MEDICINE CLINIC | Facility: CLINIC | Age: 61
End: 2021-10-05
Payer: COMMERCIAL

## 2021-10-05 VITALS
WEIGHT: 168.81 LBS | HEIGHT: 69 IN | HEART RATE: 52 BPM | DIASTOLIC BLOOD PRESSURE: 71 MMHG | BODY MASS INDEX: 25 KG/M2 | SYSTOLIC BLOOD PRESSURE: 116 MMHG

## 2021-10-05 DIAGNOSIS — N40.0 BENIGN PROSTATIC HYPERPLASIA WITHOUT LOWER URINARY TRACT SYMPTOMS: ICD-10-CM

## 2021-10-05 DIAGNOSIS — N52.9 ERECTILE DYSFUNCTION, UNSPECIFIED ERECTILE DYSFUNCTION TYPE: ICD-10-CM

## 2021-10-05 DIAGNOSIS — Z00.00 PHYSICAL EXAM: Primary | ICD-10-CM

## 2021-10-05 DIAGNOSIS — R19.5 STOOL GUAIAC POSITIVE: ICD-10-CM

## 2021-10-05 PROCEDURE — 99396 PREV VISIT EST AGE 40-64: CPT | Performed by: FAMILY MEDICINE

## 2021-10-05 PROCEDURE — 3074F SYST BP LT 130 MM HG: CPT | Performed by: FAMILY MEDICINE

## 2021-10-05 PROCEDURE — 3078F DIAST BP <80 MM HG: CPT | Performed by: FAMILY MEDICINE

## 2021-10-05 PROCEDURE — 3008F BODY MASS INDEX DOCD: CPT | Performed by: FAMILY MEDICINE

## 2021-10-05 RX ORDER — TAMSULOSIN HYDROCHLORIDE 0.4 MG/1
CAPSULE ORAL
COMMUNITY
Start: 2021-08-07 | End: 2021-10-05

## 2021-10-05 RX ORDER — TAMSULOSIN HYDROCHLORIDE 0.4 MG/1
0.4 CAPSULE ORAL DAILY
Qty: 90 CAPSULE | Refills: 2 | Status: SHIPPED | OUTPATIENT
Start: 2021-10-05

## 2021-10-05 RX ORDER — TRAMADOL HYDROCHLORIDE 50 MG/1
50 TABLET ORAL EVERY 6 HOURS PRN
COMMUNITY
Start: 2021-09-22

## 2021-10-05 NOTE — PROGRESS NOTES
10/5/2021  1:11 PM    Noa Phlegm is a 57year old male. Chief complaint(s): Patient presents with:  Routine Physical    HPI:     Noa Phlegm primary complaint is regarding CPE.      Noa Phlegm is a 57year old male is here for routine Systems   Constitutional: Negative for appetite change, fatigue and fever. HENT: Negative for hearing loss and nosebleeds. Eyes: Negative for pain and visual disturbance. Respiratory: Negative for apnea and shortness of breath.     Cardiovascular: Ne dificits. Normal gait and coordination. Psychiatric:      Comments: Appropriate affect and misdemeanor. LABORATORY RESULTS:     EKG / Spirometry : -     Radiology: No results found.      ASSESSMENT/PLAN:   Assessment   Physical exam  (primary enc until he is ready to form a family. Use of condoms may prevent transmission of infections as well as pregnancy. FOLLOW-UP: Schedule a follow-up visit in 2 months.          Orders This Visit:  Orders Placed This Encounter      CBC With Differential With ZENIA

## 2021-10-07 ENCOUNTER — OFFICE VISIT (OUTPATIENT)
Dept: GASTROENTEROLOGY | Facility: CLINIC | Age: 61
End: 2021-10-07
Payer: COMMERCIAL

## 2021-10-07 ENCOUNTER — TELEPHONE (OUTPATIENT)
Dept: GASTROENTEROLOGY | Facility: CLINIC | Age: 61
End: 2021-10-07

## 2021-10-07 VITALS
DIASTOLIC BLOOD PRESSURE: 72 MMHG | HEIGHT: 69 IN | SYSTOLIC BLOOD PRESSURE: 112 MMHG | HEART RATE: 63 BPM | WEIGHT: 165 LBS | BODY MASS INDEX: 24.44 KG/M2

## 2021-10-07 DIAGNOSIS — R19.5 HEME POSITIVE STOOL: Primary | ICD-10-CM

## 2021-10-07 DIAGNOSIS — R19.5 STOOL CONTENTS FINDING, ABNORMAL: Primary | ICD-10-CM

## 2021-10-07 DIAGNOSIS — K62.5 RECTAL BLEEDING: ICD-10-CM

## 2021-10-07 DIAGNOSIS — K62.89 RECTAL PAIN: ICD-10-CM

## 2021-10-07 PROCEDURE — 3008F BODY MASS INDEX DOCD: CPT | Performed by: INTERNAL MEDICINE

## 2021-10-07 PROCEDURE — 3078F DIAST BP <80 MM HG: CPT | Performed by: INTERNAL MEDICINE

## 2021-10-07 PROCEDURE — 99243 OFF/OP CNSLTJ NEW/EST LOW 30: CPT | Performed by: INTERNAL MEDICINE

## 2021-10-07 PROCEDURE — 3074F SYST BP LT 130 MM HG: CPT | Performed by: INTERNAL MEDICINE

## 2021-10-07 NOTE — PATIENT INSTRUCTIONS
Schedule colonoscopy for Hemoccult positive stool and rectal pain following a split dose MiraLAX/Gatorade preparation and either IV sedation or monitored anesthesia care per scheduling.

## 2021-10-07 NOTE — PROGRESS NOTES
Subjective:   Patient ID: Adelina Francisco is a 57year old male. HPI  The patient is seen today at the request of Dr. Kala Seay for evaluation of Hemoccult positive stool and rectal bleeding.   History is conducted today with the aid of Devin Penny • tamsulosin (FLOMAX) cap Take 1 capsule (0.4 mg total) by mouth daily. 90 capsule 2   • Multiple Vitamins-Minerals (MULTIVITAMIN ADULT) Oral Tab Take 1 tablet by mouth daily.  100 tablet 3     Allergies:No Known Allergies    Objective:   Physical Exam  V including bleeding and perforation requiring surgery was discussed. The risks of delayed diagnosis if testing is not performed was discussed as well.   The patient is agreeable to proceeding with a colonoscopy which will be arranged following a split dose

## 2021-10-07 NOTE — TELEPHONE ENCOUNTER
Scheduled for:  Colonoscopy 35581  Provider Name:  Dr Lianna Morris  Date:  10/12/2021  Location:  [facility]  Sedation:  mac  Time:  4206 (pt is aware to arrive at 0945)  Prep:Colyte    Meds/Allergies Reconciled?:  Physician reviewed  Diagnosis with codes:  Posit

## 2021-10-07 NOTE — PAT NURSING NOTE
Spoke with patient's daughter Elena Cruz regarding patient's procedure 10/12.  Daughter mentioned that she believes that her father is taking a medication for erectile dysfunction (ED) that is not on the patient's list in his chart but does not know the name of it

## 2021-10-09 ENCOUNTER — LAB ENCOUNTER (OUTPATIENT)
Dept: LAB | Age: 61
End: 2021-10-09
Attending: INTERNAL MEDICINE
Payer: COMMERCIAL

## 2021-10-09 DIAGNOSIS — Z01.818 PRE-OP TESTING: ICD-10-CM

## 2021-10-12 ENCOUNTER — ANESTHESIA EVENT (OUTPATIENT)
Dept: ENDOSCOPY | Facility: HOSPITAL | Age: 61
End: 2021-10-12
Payer: COMMERCIAL

## 2021-10-12 ENCOUNTER — HOSPITAL ENCOUNTER (OUTPATIENT)
Facility: HOSPITAL | Age: 61
Setting detail: HOSPITAL OUTPATIENT SURGERY
Discharge: HOME | End: 2021-10-12
Attending: INTERNAL MEDICINE | Admitting: INTERNAL MEDICINE
Payer: COMMERCIAL

## 2021-10-12 ENCOUNTER — TELEPHONE (OUTPATIENT)
Dept: GASTROENTEROLOGY | Facility: CLINIC | Age: 61
End: 2021-10-12

## 2021-10-12 ENCOUNTER — ANESTHESIA (OUTPATIENT)
Dept: ENDOSCOPY | Facility: HOSPITAL | Age: 61
End: 2021-10-12
Payer: COMMERCIAL

## 2021-10-12 VITALS
DIASTOLIC BLOOD PRESSURE: 74 MMHG | HEART RATE: 53 BPM | TEMPERATURE: 98 F | WEIGHT: 165 LBS | RESPIRATION RATE: 14 BRPM | SYSTOLIC BLOOD PRESSURE: 107 MMHG | OXYGEN SATURATION: 99 % | HEIGHT: 66 IN | BODY MASS INDEX: 26.52 KG/M2

## 2021-10-12 DIAGNOSIS — Z01.818 PRE-OP TESTING: Primary | ICD-10-CM

## 2021-10-12 DIAGNOSIS — K62.89 RECTAL PAIN: ICD-10-CM

## 2021-10-12 DIAGNOSIS — R19.5 HEME POSITIVE STOOL: ICD-10-CM

## 2021-10-12 PROCEDURE — 45378 DIAGNOSTIC COLONOSCOPY: CPT | Performed by: INTERNAL MEDICINE

## 2021-10-12 PROCEDURE — 0DJD8ZZ INSPECTION OF LOWER INTESTINAL TRACT, VIA NATURAL OR ARTIFICIAL OPENING ENDOSCOPIC: ICD-10-PCS | Performed by: INTERNAL MEDICINE

## 2021-10-12 RX ORDER — NALOXONE HYDROCHLORIDE 0.4 MG/ML
80 INJECTION, SOLUTION INTRAMUSCULAR; INTRAVENOUS; SUBCUTANEOUS AS NEEDED
Status: DISCONTINUED | OUTPATIENT
Start: 2021-10-12 | End: 2021-10-12

## 2021-10-12 RX ORDER — SODIUM CHLORIDE, SODIUM LACTATE, POTASSIUM CHLORIDE, CALCIUM CHLORIDE 600; 310; 30; 20 MG/100ML; MG/100ML; MG/100ML; MG/100ML
INJECTION, SOLUTION INTRAVENOUS CONTINUOUS
Status: DISCONTINUED | OUTPATIENT
Start: 2021-10-12 | End: 2021-10-12

## 2021-10-12 RX ORDER — LIDOCAINE HYDROCHLORIDE 10 MG/ML
INJECTION, SOLUTION EPIDURAL; INFILTRATION; INTRACAUDAL; PERINEURAL AS NEEDED
Status: DISCONTINUED | OUTPATIENT
Start: 2021-10-12 | End: 2021-10-12 | Stop reason: SURG

## 2021-10-12 RX ADMIN — SODIUM CHLORIDE, SODIUM LACTATE, POTASSIUM CHLORIDE, CALCIUM CHLORIDE: 600; 310; 30; 20 INJECTION, SOLUTION INTRAVENOUS at 11:22:00

## 2021-10-12 RX ADMIN — LIDOCAINE HYDROCHLORIDE 50 MG: 10 INJECTION, SOLUTION EPIDURAL; INFILTRATION; INTRACAUDAL; PERINEURAL at 11:26:00

## 2021-10-12 NOTE — ANESTHESIA POSTPROCEDURE EVALUATION
Patient: Elvi Vinson    Procedure Summary     Date: 10/12/21 Room / Location: [address] [place] [address] ENDOSCOPY 04 / [address] [place] [address] ENDOSCOPY    Anesthesia Start: 0798 Anesthesia Stop: 0662    Procedure: COLONOSCOPY (N/A ) Diagnosis:       Heme positive stool      Rectal pain

## 2021-10-12 NOTE — TELEPHONE ENCOUNTER
Entered into Epic. Recall CLN in 10 years per Dr. Carlos Amezcua. Last CLN done 10/12/2021. Recall entered into Patient Outreach for 10/12/2031. HM updated.

## 2021-10-12 NOTE — ANESTHESIA PREPROCEDURE EVALUATION
Anesthesia PreOp Note    HPI:     Marine Webb is a 57year old male who presents for preoperative consultation requested by: Sommer Elena MD    Date of Surgery: 10/12/2021    Procedure(s):  COLONOSCOPY  Indication: Heme positive stool  Recta History      Not on file    Tobacco Use      Smoking status: Never Smoker      Smokeless tobacco: Never Used    Vaping Use      Vaping Use: Never used    Substance and Sexual Activity      Alcohol use: Yes        Comment: 1 beer, monthly      Drug use:  No   Unable to Pay for Housing in the Last Year: Not on file      Number of Places Lived in the Last Year: Not on file      Unstable Housing in the Last Year: Not on file    Available pre-op labs reviewed.   Lab Results   Component Value Date    WBC 6.6 10/07/

## 2021-10-12 NOTE — H&P
History & Physical Examination    Patient Name: Lita Bhatia  MRN: [id number]  [facility]: [id number]  YOB: 1960    Diagnosis: Hemoccult positive stool, rectal pain      Multiple Vitamins-Minerals (MULTIVITAMIN ADULT) Oral Tab, Take 1 tablet by

## 2021-10-12 NOTE — OPERATIVE REPORT
[facility] Endoscopy Report      Date of Procedure:  10/12/21      Preoperative Diagnosis:  1. Hemoccult positive stool  2.   Rectal pain      Postoperative Diagnosis:  Internal hemorrhoids      Procedure:    Colonoscopy       Surgeon:  Justin Knight colonoscopy in 10 years.         Lamberto Mcadams MD  10/12/2021

## 2021-10-19 ENCOUNTER — TELEPHONE (OUTPATIENT)
Dept: FAMILY MEDICINE CLINIC | Facility: CLINIC | Age: 61
End: 2021-10-19

## 2021-10-19 ENCOUNTER — OFFICE VISIT (OUTPATIENT)
Dept: FAMILY MEDICINE CLINIC | Facility: CLINIC | Age: 61
End: 2021-10-19
Payer: COMMERCIAL

## 2021-10-19 VITALS
HEART RATE: 67 BPM | BODY MASS INDEX: 27.64 KG/M2 | WEIGHT: 172 LBS | HEIGHT: 66 IN | DIASTOLIC BLOOD PRESSURE: 80 MMHG | SYSTOLIC BLOOD PRESSURE: 135 MMHG

## 2021-10-19 DIAGNOSIS — E78.00 PURE HYPERCHOLESTEROLEMIA: Primary | ICD-10-CM

## 2021-10-19 DIAGNOSIS — N52.9 ERECTILE DYSFUNCTION, UNSPECIFIED ERECTILE DYSFUNCTION TYPE: ICD-10-CM

## 2021-10-19 DIAGNOSIS — K64.4 HEMORRHOIDS, EXTERNAL: ICD-10-CM

## 2021-10-19 PROCEDURE — 3075F SYST BP GE 130 - 139MM HG: CPT | Performed by: FAMILY MEDICINE

## 2021-10-19 PROCEDURE — 3079F DIAST BP 80-89 MM HG: CPT | Performed by: FAMILY MEDICINE

## 2021-10-19 PROCEDURE — 99213 OFFICE O/P EST LOW 20 MIN: CPT | Performed by: FAMILY MEDICINE

## 2021-10-19 PROCEDURE — 3008F BODY MASS INDEX DOCD: CPT | Performed by: FAMILY MEDICINE

## 2021-10-19 RX ORDER — HYDROCORTISONE 25 MG/G
1 CREAM TOPICAL 2 TIMES DAILY
Qty: 28 G | Refills: 1 | Status: SHIPPED | OUTPATIENT
Start: 2021-10-19

## 2021-10-19 RX ORDER — SILDENAFIL 100 MG/1
100 TABLET, FILM COATED ORAL AS NEEDED
Qty: 24 TABLET | Refills: 3 | Status: SHIPPED | OUTPATIENT
Start: 2021-10-19

## 2021-10-19 RX ORDER — ATORVASTATIN CALCIUM 20 MG/1
20 TABLET, FILM COATED ORAL NIGHTLY
Qty: 90 TABLET | Refills: 3 | Status: SHIPPED | OUTPATIENT
Start: 2021-10-19

## 2021-10-19 NOTE — PROGRESS NOTES
10/19/2021  1:43 PM    Patel Settler is a 57year old male. Chief complaint(s): Patient presents with:  Test Results: 2 months f/u    HPI:     Patel Settler primary complaint is regarding multiple complaints.      In regard to the hyperlipidemia, monthly    Drug use: No       Immunizations:     Immunization History  Administered            Date(s) Administered    Influenza             11/01/2017      TDAP                  06/29/2020      Medications (Active prior to today's visit):  Current Outpati Findings: No rash. Psychiatric:         Behavior: Behavior is cooperative. LABORATORY RESULTS:     EKG / Spirometry : -     Radiology: No results found.      ASSESSMENT/PLAN:   Assessment   Pure hypercholesterolemia  (primary encounter diagnosis) FOLLOW-UP: Schedule a follow-up visit in 6 months / prn. Orders This Visit:  No orders of the defined types were placed in this encounter.       Meds This Visit:  Requested Prescriptions     Signed Prescriptions Disp Refills   • atorvastatin

## 2021-10-19 NOTE — TELEPHONE ENCOUNTER
Sildenafil Citrate (VIAGRA) 100 MG Oral Tab, Take 1 tablet (100 mg total) by mouth as needed for Erectile Dysfunction. , Disp: 24 tablet, Rfl: 3    Pharmacy note: Plan does not cover medication.  Please call plan at [phone] to initiate PA or call/fax ph

## 2021-10-25 NOTE — TELEPHONE ENCOUNTER
Spoke to Uvaldo from [place] and I have asked her to re-run the prescription to see if it has gone through. it still didn't go through.  She provided me with the pharmacy help desk number  to Guille Gallardo from adjust and they rec

## 2022-02-09 RX ORDER — MULTIVITAMIN
1 TABLET ORAL DAILY
Qty: 90 TABLET | Refills: 1 | Status: SHIPPED | OUTPATIENT
Start: 2022-02-09

## 2022-02-09 NOTE — TELEPHONE ENCOUNTER
Please review. Protocol failed or does not have a protocol.      Requested Prescriptions   Pending Prescriptions Disp Refills    MULTIVITAMIN Oral Tab [Pharmacy Med Name: MULTIVITAMIN TABLETS] 100 tablet 3     Sig: TAKE 1 TABLET BY MOUTH DAILY        There is no refill protocol information for this order           Recent Outpatient Visits              3 months ago Pure hypercholesterolemia    [facility], [facility], [address], Roz Fisher MD    Office Visit    4 months ago Stool contents finding, abnormal    Aditya Ruiz MD    Office Visit    4 months ago Physical exam    [address] [facility]    Office Visit    1 year ago Physical exam    [address] MD    Office Visit    1 year ago Night sweats    [address] MD    Office Visit

## 2022-11-01 RX ORDER — TAMSULOSIN HYDROCHLORIDE 0.4 MG/1
CAPSULE ORAL
Qty: 90 CAPSULE | Refills: 0 | OUTPATIENT
Start: 2022-11-01

## 2022-11-01 NOTE — TELEPHONE ENCOUNTER
Please review. Protocol failed or has no protocol.     Requested Prescriptions   Pending Prescriptions Disp Refills    TAMSULOSIN 0.4 MG Oral Cap [Pharmacy Med Name: TAMSULOSIN 0.4MG CAPSULES] 90 capsule 2     Sig: TAKE 1 CAPSULE(0.4 MG) BY MOUTH DAILY       Genitourinary Medications Failed - 10/31/2022  5:59 PM        Failed - In person appointment or virtual visit in the past 12 mos or appointment in next 3 mos     Recent Outpatient Visits              1 year ago Pure hypercholesterolemia    [address] MD    Office Visit    1 year ago Stool contents finding, abnormal    Edmond Malone MD    Office Visit    1 year ago Physical exam    [address] MD    Office Visit    2 years ago Physical exam    [address] MD    Office Visit    2 years ago Night sweats    [address] MD    Office Visit                      Passed - Patient does not have pulmonary hypertension on problem list             Recent Outpatient Visits              1 year ago Pure hypercholesterolemia    [address] MD    Office Visit    1 year ago Stool contents finding, abnormal    Edmond Malone MD    Office Visit    1 year ago Physical exam    [address] MD    Office Visit    2 years ago Physical exam    [address] MD    Office Visit    2 years ago Night sweats    [address] MD    Office Visit

## 2022-11-02 RX ORDER — TAMSULOSIN HYDROCHLORIDE 0.4 MG/1
0.4 CAPSULE ORAL DAILY
Qty: 90 CAPSULE | Refills: 0 | Status: SHIPPED | OUTPATIENT
Start: 2022-11-02

## 2022-11-02 NOTE — TELEPHONE ENCOUNTER
Per Elenora Show daughter of patient, patient has an appointment already on 12/08/2022 and if possible to give patient medication until patient sees the doctor due to patient is totally out of med.

## 2022-11-02 NOTE — TELEPHONE ENCOUNTER
Refill passed per [facility] protocol. Requested Prescriptions   Pending Prescriptions Disp Refills    tamsulosin 0.4 MG Oral Cap 90 capsule 0     Sig: Take 1 capsule (0.4 mg total) by mouth in the morning.        Genitourinary Medications Passed - 11/2/2022  2:47 PM        Passed - Patient does not have pulmonary hypertension on problem list        Passed - In person appointment or virtual visit in the past 12 mos or appointment in next 3 mos     Recent Outpatient Visits              1 year ago Pure hypercholesterolemia    Yrn Sánchez, Mark Hung MD    Office Visit    1 year ago Stool contents finding, abnormal    Abdulaziz Beaulieu MD    Office Visit    1 year ago Physical exam    Mark Alvarado MD    Office Visit    2 years ago Physical exam    Mark Alvarado MD    Office Visit    2 years ago Night sweats    [facility], [address] Martinez, Mark Hung MD    Office Visit          Future Appointments         Provider Department Appt Notes    In 1 month Ivette Hung MD [facility], [address] Martinez, Mark physical ---last px 10/5/21                Refused Prescriptions Disp Refills    TAMSULOSIN 0.4 MG Oral Cap [Pharmacy Med Name: TAMSULOSIN 0.4MG CAPSULES] 90 capsule 0     Sig: TAKE 1 CAPSULE(0.4 MG) BY MOUTH DAILY       Genitourinary Medications Passed - 11/2/2022  2:47 PM        Passed - Patient does not have pulmonary hypertension on problem list        Passed - In person appointment or virtual visit in the past 12 mos or appointment in next 3 mos     Recent Outpatient Visits              1 year ago Pure hypercholesterolemia    Shahla Alvarado MD    Office Visit    1 year ago Stool contents finding, abnormal    Abdulaziz Beaulieu MD    Office Visit    1 year ago Physical exam    Lary Lawson, Mark Brian MD    Office Visit    2 years ago Physical exam    Lary Lawson, Mark Brian MD    Office Visit    2 years ago Night sweats    Lary Lawson, Mark Brian MD    Office Visit          Future Appointments         Provider Department Appt Notes    In 1 month Dia Brian MD [address], [facility] physical ---last px 10/5/21                  Recent Outpatient Visits              1 year ago Pure hypercholesterolemia    Lary Lawson, Mark Brian MD    Office Visit    1 year ago Stool contents finding, abnormal    Clevester Massing, MD    Office Visit    1 year ago Physical exam    Lary Lawson, Mark Brian MD    Office Visit    2 years ago Physical exam    Lary Lawson, Mark Brian MD    Office Visit    2 years ago Night sweats    Lary Lawson, Mark Brian MD    Office Visit          Future Appointments         Provider Department Appt Notes    In 1 month Dia Brian MD [address], [facility] physical ---last px 10/5/21

## 2022-11-03 ENCOUNTER — HOSPITAL ENCOUNTER (OUTPATIENT)
Age: 62
Discharge: HOME | End: 2022-11-03
Payer: COMMERCIAL

## 2022-11-03 ENCOUNTER — APPOINTMENT (OUTPATIENT)
Dept: GENERAL RADIOLOGY | Age: 62
End: 2022-11-03
Attending: PHYSICIAN ASSISTANT
Payer: COMMERCIAL

## 2022-11-03 ENCOUNTER — APPOINTMENT (OUTPATIENT)
Dept: CT IMAGING | Age: 62
End: 2022-11-03
Attending: PHYSICIAN ASSISTANT
Payer: COMMERCIAL

## 2022-11-03 VITALS
SYSTOLIC BLOOD PRESSURE: 112 MMHG | HEART RATE: 76 BPM | RESPIRATION RATE: 20 BRPM | DIASTOLIC BLOOD PRESSURE: 73 MMHG | OXYGEN SATURATION: 100 % | TEMPERATURE: 99 F

## 2022-11-03 DIAGNOSIS — U07.1 COVID-19 VIRUS INFECTION: Primary | ICD-10-CM

## 2022-11-03 DIAGNOSIS — R07.9 CHEST PAIN, UNSPECIFIED TYPE: ICD-10-CM

## 2022-11-03 LAB
#MXD IC: 1.1 X10ˆ3/UL (ref 0.1–1)
BUN BLD-MCNC: 10 MG/DL (ref 7–18)
CHLORIDE BLD-SCNC: 101 MMOL/L (ref 98–112)
CO2 BLD-SCNC: 27 MMOL/L (ref 21–32)
CREAT BLD-MCNC: 1 MG/DL
DDIMER WHOLE BLOOD: 659 NG/ML DDU (ref ?–400)
GFR SERPLBLD BASED ON 1.73 SQ M-ARVRAT: 85 ML/MIN/1.73M2 (ref 60–?)
GLUCOSE BLD-MCNC: 98 MG/DL (ref 70–99)
HCT VFR BLD AUTO: 43.9 %
HCT VFR BLD CALC: 46 %
HGB BLD-MCNC: 14.4 G/DL
ISTAT IONIZED CALCIUM FOR CHEM 8: 1.12 MMOL/L (ref 1.12–1.32)
LYMPHOCYTES # BLD AUTO: 2.1 X10ˆ3/UL (ref 1–4)
LYMPHOCYTES NFR BLD AUTO: 30.2 %
MCH RBC QN AUTO: 29.2 PG (ref 26–34)
MCHC RBC AUTO-ENTMCNC: 32.8 G/DL (ref 31–37)
MCV RBC AUTO: 89 FL (ref 80–100)
MIXED CELL %: 15.8 %
NEUTROPHILS # BLD AUTO: 3.6 X10ˆ3/UL (ref 1.5–7.7)
NEUTROPHILS NFR BLD AUTO: 54 %
PLATELET # BLD AUTO: 250 X10ˆ3/UL (ref 150–450)
POTASSIUM BLD-SCNC: 4.2 MMOL/L (ref 3.6–5.1)
RBC # BLD AUTO: 4.93 X10ˆ6/UL
SARS-COV-2 RNA RESP QL NAA+PROBE: DETECTED
SODIUM BLD-SCNC: 139 MMOL/L (ref 136–145)
TROPONIN I BLD-MCNC: 0.02 NG/ML
WBC # BLD AUTO: 6.8 X10ˆ3/UL (ref 4–11)

## 2022-11-03 PROCEDURE — 71260 CT THORAX DX C+: CPT | Performed by: PHYSICIAN ASSISTANT

## 2022-11-03 PROCEDURE — 85025 COMPLETE CBC W/AUTO DIFF WBC: CPT | Performed by: PHYSICIAN ASSISTANT

## 2022-11-03 PROCEDURE — 93010 ELECTROCARDIOGRAM REPORT: CPT

## 2022-11-03 PROCEDURE — 93005 ELECTROCARDIOGRAM TRACING: CPT

## 2022-11-03 PROCEDURE — 80047 BASIC METABLC PNL IONIZED CA: CPT

## 2022-11-03 PROCEDURE — 85378 FIBRIN DEGRADE SEMIQUANT: CPT | Performed by: PHYSICIAN ASSISTANT

## 2022-11-03 PROCEDURE — 99215 OFFICE O/P EST HI 40 MIN: CPT

## 2022-11-03 PROCEDURE — 36415 COLL VENOUS BLD VENIPUNCTURE: CPT

## 2022-11-03 PROCEDURE — 99204 OFFICE O/P NEW MOD 45 MIN: CPT

## 2022-11-03 PROCEDURE — 71046 X-RAY EXAM CHEST 2 VIEWS: CPT | Performed by: PHYSICIAN ASSISTANT

## 2022-11-03 PROCEDURE — 84484 ASSAY OF TROPONIN QUANT: CPT

## 2022-11-03 PROCEDURE — 93010 ELECTROCARDIOGRAM REPORT: CPT | Performed by: PHYSICIAN ASSISTANT

## 2022-11-03 RX ORDER — ACETAMINOPHEN 325 MG/1
650 TABLET ORAL
Qty: 40 TABLET | Refills: 0 | Status: SHIPPED | OUTPATIENT
Start: 2022-11-03

## 2022-11-03 RX ORDER — CODEINE PHOSPHATE AND GUAIFENESIN 10; 100 MG/5ML; MG/5ML
5 SOLUTION ORAL EVERY 6 HOURS PRN
Qty: 50 ML | Refills: 0 | Status: SHIPPED | OUTPATIENT
Start: 2022-11-03

## 2022-11-03 RX ORDER — BENZONATATE 100 MG/1
100 CAPSULE ORAL 3 TIMES DAILY PRN
Qty: 30 CAPSULE | Refills: 0 | Status: SHIPPED | OUTPATIENT
Start: 2022-11-03 | End: 2022-12-03

## 2022-11-03 RX ORDER — ALBUTEROL SULFATE 90 UG/1
2 AEROSOL, METERED RESPIRATORY (INHALATION) EVERY 4 HOURS PRN
Qty: 1 EACH | Refills: 0 | Status: SHIPPED | OUTPATIENT
Start: 2022-11-03 | End: 2022-12-03

## 2022-12-08 ENCOUNTER — OFFICE VISIT (OUTPATIENT)
Dept: FAMILY MEDICINE CLINIC | Facility: CLINIC | Age: 62
End: 2022-12-08
Payer: COMMERCIAL

## 2022-12-08 VITALS — WEIGHT: 180.19 LBS | BODY MASS INDEX: 29 KG/M2

## 2022-12-08 DIAGNOSIS — Z00.00 PHYSICAL EXAM: Primary | ICD-10-CM

## 2022-12-08 PROCEDURE — 99396 PREV VISIT EST AGE 40-64: CPT | Performed by: FAMILY MEDICINE

## 2022-12-22 LAB
ABSOLUTE BASOPHILS: 20 CELLS/UL (ref 0–200)
ABSOLUTE EOSINOPHILS: 150 CELLS/UL (ref 15–500)
ABSOLUTE LYMPHOCYTES: 3318 CELLS/UL (ref 850–3900)
ABSOLUTE MONOCYTES: 510 CELLS/UL (ref 200–950)
ABSOLUTE NEUTROPHILS: 2802 CELLS/UL (ref 1500–7800)
ALBUMIN/GLOBULIN RATIO: 1.3 (CALC) (ref 1–2.5)
ALBUMIN: 4.4 G/DL (ref 3.6–5.1)
ALKALINE PHOSPHATASE: 68 U/L (ref 35–144)
ALT: 32 U/L (ref 9–46)
APPEARANCE: CLEAR
AST: 26 U/L (ref 10–35)
BASOPHILS: 0.3 %
BILIRUBIN, TOTAL: 0.6 MG/DL (ref 0.2–1.2)
BILIRUBIN: NEGATIVE
BUN: 11 MG/DL (ref 7–25)
CALCIUM: 9.5 MG/DL (ref 8.6–10.3)
CARBON DIOXIDE: 30 MMOL/L (ref 20–32)
CHLORIDE: 102 MMOL/L (ref 98–110)
CHOL/HDLC RATIO: 5.4 (CALC)
CHOLESTEROL, TOTAL: 265 MG/DL
COLOR: YELLOW
CREATININE: 0.88 MG/DL (ref 0.7–1.35)
EGFR: 97 ML/MIN/1.73M2
EOSINOPHILS: 2.2 %
GLOBULIN: 3.5 G/DL (CALC) (ref 1.9–3.7)
GLUCOSE: 94 MG/DL (ref 65–99)
GLUCOSE: NEGATIVE
HDL CHOLESTEROL: 49 MG/DL
HEMATOCRIT: 47.8 % (ref 38.5–50)
HEMOGLOBIN A1C: 5.7 % OF TOTAL HGB
HEMOGLOBIN: 16.2 G/DL (ref 13.2–17.1)
KETONES: NEGATIVE
LDL-CHOLESTEROL: 178 MG/DL (CALC)
LEUKOCYTE ESTERASE: NEGATIVE
LYMPHOCYTES: 48.8 %
MCH: 30.2 PG (ref 27–33)
MCHC: 33.9 G/DL (ref 32–36)
MCV: 89.2 FL (ref 80–100)
MONOCYTES: 7.5 %
MPV: 11.6 FL (ref 7.5–12.5)
NEUTROPHILS: 41.2 %
NITRITE: NEGATIVE
NON-HDL CHOLESTEROL: 216 MG/DL (CALC)
OCCULT BLOOD: NEGATIVE
PH: 7.5 (ref 5–8)
PLATELET COUNT: 283 THOUSAND/UL (ref 140–400)
POTASSIUM: 5 MMOL/L (ref 3.5–5.3)
PROTEIN, TOTAL: 7.9 G/DL (ref 6.1–8.1)
RDW: 13.3 % (ref 11–15)
RED BLOOD CELL COUNT: 5.36 MILLION/UL (ref 4.2–5.8)
SODIUM: 139 MMOL/L (ref 135–146)
SPECIFIC GRAVITY: 1.01 (ref 1–1.03)
TOTAL PSA: 1.1 NG/ML
TRIGLYCERIDES: 214 MG/DL
TSH W/REFLEX TO FT4: 2.58 MIU/L (ref 0.4–4.5)
VITAMIN D, 25-OH, TOTAL: 22 NG/ML (ref 30–100)
WHITE BLOOD CELL COUNT: 6.8 THOUSAND/UL (ref 3.8–10.8)

## 2023-03-05 RX ORDER — HYDROCORTISONE 25 MG/G
CREAM TOPICAL
Qty: 28 G | Refills: 1 | OUTPATIENT
Start: 2023-03-05

## 2023-04-26 RX ORDER — ATORVASTATIN CALCIUM 20 MG/1
20 TABLET, FILM COATED ORAL NIGHTLY
Qty: 90 TABLET | Refills: 3 | Status: SHIPPED | OUTPATIENT
Start: 2023-04-26

## 2023-04-26 NOTE — TELEPHONE ENCOUNTER
Please review. Protocol failed / No protocol. See pended Rx for review and approval.     Requested Prescriptions   Pending Prescriptions Disp Refills    atorvastatin 20 MG Oral Tab 90 tablet 3     Sig: Take 1 tablet (20 mg total) by mouth nightly.        Cholesterol Medication Protocol Failed - 4/25/2023  3:00 PM        Failed - Last LDL < 130     Lab Results   Component Value Date     (H) 12/21/2022               Passed - ALT in past 12 months        Passed - LDL in past 12 months        Passed - Last ALT < 80     Lab Results   Component Value Date    ALT 32 12/21/2022             Passed - In person appointment or virtual visit in the past 12 mos or appointment in next 3 mos     Recent Outpatient Visits              4 months ago Physical exam    Coy Tan MD    Office Visit    1 year ago Pure hypercholesterolemia    Coy Tan MD    Office Visit    1 year ago Stool contents finding, abnormal    Isaac Kent MD    Office Visit    1 year ago Physical exam    Coy Tan MD    Office Visit    2 years ago Physical exam    Coy Tan MD    Office Visit                           Recent Outpatient Visits              4 months ago Physical exam    Coy Tan MD    Office Visit    1 year ago Pure hypercholesterolemia    Coy Tan MD    Office Visit    1 year ago Stool contents finding, abnormal    Isaac Kent MD    Office Visit    1 year ago Physical exam    [address], [facility], Coy Roman MD Office Visit    2 years ago Physical exam    Noel Figueroa MD    Office Visit

## 2023-06-02 ENCOUNTER — NURSE TRIAGE (OUTPATIENT)
Dept: FAMILY MEDICINE CLINIC | Facility: CLINIC | Age: 63
End: 2023-06-02

## 2023-06-07 ENCOUNTER — HOSPITAL ENCOUNTER (OUTPATIENT)
Dept: GENERAL RADIOLOGY | Age: 63
Discharge: HOME | End: 2023-06-07
Attending: NURSE PRACTITIONER
Payer: COMMERCIAL

## 2023-06-07 ENCOUNTER — OFFICE VISIT (OUTPATIENT)
Dept: FAMILY MEDICINE CLINIC | Facility: CLINIC | Age: 63
End: 2023-06-07

## 2023-06-07 VITALS
DIASTOLIC BLOOD PRESSURE: 75 MMHG | BODY MASS INDEX: 26.97 KG/M2 | WEIGHT: 180 LBS | SYSTOLIC BLOOD PRESSURE: 115 MMHG | HEIGHT: 68.5 IN | HEART RATE: 62 BPM

## 2023-06-07 DIAGNOSIS — M25.561 CHRONIC PAIN OF RIGHT KNEE: ICD-10-CM

## 2023-06-07 DIAGNOSIS — G89.29 CHRONIC PAIN OF RIGHT KNEE: Primary | ICD-10-CM

## 2023-06-07 DIAGNOSIS — N52.9 ERECTILE DYSFUNCTION, UNSPECIFIED ERECTILE DYSFUNCTION TYPE: ICD-10-CM

## 2023-06-07 DIAGNOSIS — G89.29 CHRONIC PAIN OF LEFT KNEE: ICD-10-CM

## 2023-06-07 DIAGNOSIS — M25.561 CHRONIC PAIN OF RIGHT KNEE: Primary | ICD-10-CM

## 2023-06-07 DIAGNOSIS — M25.562 CHRONIC PAIN OF LEFT KNEE: ICD-10-CM

## 2023-06-07 DIAGNOSIS — G89.29 CHRONIC PAIN OF RIGHT KNEE: ICD-10-CM

## 2023-06-07 PROCEDURE — 3074F SYST BP LT 130 MM HG: CPT | Performed by: NURSE PRACTITIONER

## 2023-06-07 PROCEDURE — 73564 X-RAY EXAM KNEE 4 OR MORE: CPT | Performed by: NURSE PRACTITIONER

## 2023-06-07 PROCEDURE — 3078F DIAST BP <80 MM HG: CPT | Performed by: NURSE PRACTITIONER

## 2023-06-07 PROCEDURE — 99214 OFFICE O/P EST MOD 30 MIN: CPT | Performed by: NURSE PRACTITIONER

## 2023-06-07 PROCEDURE — 3008F BODY MASS INDEX DOCD: CPT | Performed by: NURSE PRACTITIONER

## 2023-06-07 RX ORDER — SILDENAFIL 100 MG/1
100 TABLET, FILM COATED ORAL AS NEEDED
Qty: 24 TABLET | Refills: 3 | Status: SHIPPED | OUTPATIENT
Start: 2023-06-07

## 2023-07-19 ENCOUNTER — MED REC SCAN ONLY (OUTPATIENT)
Dept: INTERNAL MEDICINE CLINIC | Facility: CLINIC | Age: 63
End: 2023-07-19

## 2023-12-19 ENCOUNTER — OFFICE VISIT (OUTPATIENT)
Dept: FAMILY MEDICINE CLINIC | Facility: CLINIC | Age: 63
End: 2023-12-19

## 2023-12-19 VITALS
HEIGHT: 68 IN | WEIGHT: 180.63 LBS | BODY MASS INDEX: 27.37 KG/M2 | SYSTOLIC BLOOD PRESSURE: 122 MMHG | DIASTOLIC BLOOD PRESSURE: 78 MMHG | HEART RATE: 70 BPM

## 2023-12-19 DIAGNOSIS — Z00.00 PHYSICAL EXAM: Primary | ICD-10-CM

## 2023-12-19 PROCEDURE — 3078F DIAST BP <80 MM HG: CPT | Performed by: FAMILY MEDICINE

## 2023-12-19 PROCEDURE — 3074F SYST BP LT 130 MM HG: CPT | Performed by: FAMILY MEDICINE

## 2023-12-19 PROCEDURE — 3008F BODY MASS INDEX DOCD: CPT | Performed by: FAMILY MEDICINE

## 2023-12-19 PROCEDURE — 99396 PREV VISIT EST AGE 40-64: CPT | Performed by: FAMILY MEDICINE

## 2023-12-27 ENCOUNTER — TELEPHONE (OUTPATIENT)
Dept: FAMILY MEDICINE CLINIC | Facility: CLINIC | Age: 63
End: 2023-12-27

## 2023-12-27 NOTE — TELEPHONE ENCOUNTER
[employer] called and is with the patient they are requesting that labs be sent there instead of [facility].

## 2024-01-04 ENCOUNTER — MED REC SCAN ONLY (OUTPATIENT)
Dept: FAMILY MEDICINE CLINIC | Facility: CLINIC | Age: 64
End: 2024-01-04

## 2024-01-24 ENCOUNTER — TELEPHONE (OUTPATIENT)
Dept: FAMILY MEDICINE CLINIC | Facility: CLINIC | Age: 64
End: 2024-01-24

## 2024-01-25 NOTE — TELEPHONE ENCOUNTER
Closed    Other Case ID: [id number]      Payer: [employer]    [phone]    [phone]   This request has been approved using information available on the patient's profile. CaseId:[id number];Status:Approved;Review Type:Prior Auth;Coverage Start Date:12/25/2023;Coverage End Date:01/23/2025;

## 2024-02-22 ENCOUNTER — TELEPHONE (OUTPATIENT)
Dept: FAMILY MEDICINE CLINIC | Facility: CLINIC | Age: 64
End: 2024-02-22

## 2024-02-22 NOTE — TELEPHONE ENCOUNTER
Patient's daughter, Gabi, is requesting a call to go over labs completed in December 2023. Daughter is requesting to have a copy of the results mailed to the address on file.

## 2024-02-22 NOTE — TELEPHONE ENCOUNTER
Please review 12/27/2023 labs in chart- lab scan and advise.     Clinical staff: please mail copy of 12/27/2023- lab scan in chart to home address.

## 2024-03-06 ENCOUNTER — TELEPHONE (OUTPATIENT)
Dept: FAMILY MEDICINE CLINIC | Facility: CLINIC | Age: 64
End: 2024-03-06

## 2024-03-06 NOTE — TELEPHONE ENCOUNTER
Patient   daughter  GABI calling ( identified name and  ) states  they never received any lab results , labs were dine at [facility] on  2023    Gabi requesting labs to mailed to patient, home address verified   mailed as requested        --- Upon chart review labs found under Media tab   Daughter Gabi would like to be called the review and recommendations of the results  [facility] staff please assist and thank you        Best call back number: Gabi  at [phone]  May leave VM

## 2024-03-07 RX ORDER — ERGOCALCIFEROL 1.25 MG/1
50000 CAPSULE ORAL WEEKLY
Qty: 12 CAPSULE | Refills: 3 | Status: SHIPPED | OUTPATIENT
Start: 2024-03-07 | End: 2025-03-07

## 2024-03-07 RX ORDER — ATORVASTATIN CALCIUM 20 MG/1
20 TABLET, FILM COATED ORAL NIGHTLY
Qty: 90 TABLET | Refills: 3 | Status: SHIPPED | OUTPATIENT
Start: 2024-03-07

## 2024-03-07 NOTE — TELEPHONE ENCOUNTER
Patient's DTRGabi calling (name and , JOSSUE verified) for a refill for cholesterol medication.    Routed through protocol.      See TE from 3/6/24  Stephen Sandra MD Physician Signed8:30 AM     Copy     Yes, please call patient elevated lipids. Take Atorvastatin and follow up with me in 6 months. Low vit D. Normal CMP, CBC. Rx vit D sent to pharmacy

## 2024-03-07 NOTE — TELEPHONE ENCOUNTER
With [employer]  Fernando #[id number], left messages to both patient and daughter's voice mail to call back due to the test results . ===first attempt.   Patient is NOT MyChart active.     RN =see DR Sandra's note below and  also ,clarify if the patient stopped taking the atorvastatin (see below ) and if a refill is needed.        MEDICATION RECORD :    atorvastatin 20 MG Oral Tab  Take 1 tablet (20 mg total) by mouth nightly. Dispense: 90 tablet, Refills: 3 ordered       04/26/2023 -- [id number] - [place], [place] - [address] AT [facility] [address], [phone], [phone] --  -- -- Report     Patient not taking. Reported on 12/19/2023  Edit  Cancel Reorder

## 2024-03-07 NOTE — TELEPHONE ENCOUNTER
Yes, please call patient elevated lipids. Take Atorvastatin and follow up with me in 6 months. Low vit D. Normal CMP, CBC. Rx vit D sent to pharmacy

## 2024-03-07 NOTE — TELEPHONE ENCOUNTER
Please review.  Protocol failed / No protocol.  Please review notes below.  Requested Prescriptions   Pending Prescriptions Disp Refills    atorvastatin 20 MG Oral Tab 90 tablet 3     Sig: Take 1 tablet (20 mg total) by mouth nightly.       Cholesterol Medication Protocol Failed - 3/7/2024 10:26 AM        Failed - ALT < 80     Lab Results   Component Value Date    ALT 32 12/21/2022             Failed - ALT resulted within past year        Failed - Lipid panel within past 12 months     Lab Results   Component Value Date    CHOLEST 265 (H) 12/21/2022    TRIG 214 (H) 12/21/2022    HDL 49 12/21/2022     (H) 12/21/2022    TCHDLRATIO 5.4 (H) 12/21/2022    NONHDLC 216 (H) 12/21/2022             Passed - In person appointment or virtual visit in the past 12 mos or appointment in next 3 mos     Recent Outpatient Visits              2 months ago Physical exam    [facility], Stephen Burnett MD    Office Visit    9 months ago Chronic pain of right knee    [facility], Odessa Thompson APRN    Office Visit    1 year ago Physical exam    [facility], Stephen Burnett MD    Office Visit    2 years ago Pure hypercholesterolemia    [facility], Stephen Burnett MD    Office Visit    2 years ago Stool contents finding, abnormal    [facility]    Office Visit                          Recent Outpatient Visits              2 months ago [facility]    [facility]    9 months ago [facility] pain of right knee    [facility]    [facility] Visit    1 year ago [facility]    Office Visit    2 years  ago Pure hypercholesterolemia    [facility] [address], Stephen Burnett MD    Office Visit    2 years ago Stool contents finding, abnormal    [facility], [facility], Eugene Rebolledo MD    Office Visit

## 2024-03-07 NOTE — TELEPHONE ENCOUNTER
Clinical staff, please assist    Please print out faxed in labs results under Media tab, date 24 and mail to patient's home address. (Address was verified).    Patient's daughter, Gabi calling (name and , JOSSUE verified) for lab results that were faxed in from [employer] in 2023.   Patient's daughter was (name and  verified) informed of results as per providers result note. Voiced understanding and agrees with plan.    Patient will also need refill on atorvastatin.  Routing in another encounter.

## 2024-09-09 ENCOUNTER — OFFICE VISIT (OUTPATIENT)
Dept: FAMILY MEDICINE CLINIC | Facility: CLINIC | Age: 64
End: 2024-09-09

## 2024-09-09 VITALS
HEART RATE: 56 BPM | SYSTOLIC BLOOD PRESSURE: 138 MMHG | BODY MASS INDEX: 25.91 KG/M2 | DIASTOLIC BLOOD PRESSURE: 76 MMHG | HEIGHT: 68 IN | WEIGHT: 171 LBS

## 2024-09-09 DIAGNOSIS — K57.92 DIVERTICULITIS: Primary | ICD-10-CM

## 2024-09-09 PROCEDURE — 99214 OFFICE O/P EST MOD 30 MIN: CPT | Performed by: FAMILY MEDICINE

## 2024-09-09 PROCEDURE — 3075F SYST BP GE 130 - 139MM HG: CPT | Performed by: FAMILY MEDICINE

## 2024-09-09 PROCEDURE — 3008F BODY MASS INDEX DOCD: CPT | Performed by: FAMILY MEDICINE

## 2024-09-09 PROCEDURE — 3078F DIAST BP <80 MM HG: CPT | Performed by: FAMILY MEDICINE

## 2024-09-09 RX ORDER — METRONIDAZOLE 250 MG/1
250 TABLET ORAL 3 TIMES DAILY
Qty: 30 TABLET | Refills: 0 | Status: SHIPPED | OUTPATIENT
Start: 2024-09-09 | End: 2024-09-19

## 2024-09-09 RX ORDER — CIPROFLOXACIN 250 MG/1
250 TABLET, FILM COATED ORAL 2 TIMES DAILY
Qty: 20 TABLET | Refills: 0 | Status: SHIPPED | OUTPATIENT
Start: 2024-09-09 | End: 2024-09-19

## 2024-09-09 NOTE — PROGRESS NOTES
9/9/2024  10:47 AM    Clifford Rubin is a 63 year old male.    Chief complaint(s):   Chief Complaint   Patient presents with    Abdominal Pain     LLQ pain was seen by a physician in [place] and was prescribed treatment with some improvement. No testing was done prior.     elevated psa     F/u on prostate     HPI:     Clifford Rubin primary complaint is regarding abdomina pain.     Patient is a 63-year-old male who presents complaining of left abdominal pain.  Tends to be worse in the left lower quadrant but also on the left upper quadrant.  He was treated with medications in [place] for \"inflammation.  \"Reports that abdominal pain is improved but not resolved.  Initially had some diarrhea that resolved.  Now complaining of constipation.  There is no blood in the stool, fever, nausea, vomiting.  Has never had this in the past.  Colonoscopy done in 2021 was essentially normal.  Denies any dysuria positive for polyuria and had a normal PSA done 6 months ago.      HISTORY:  Past Medical History:    Arthritis    Cross eyed    Dyslipidemia    Extrinsic asthma, unspecified    High cholesterol    PSVT (paroxysmal supraventricular tachycardia) (HCC)      Past Surgical History:   Procedure Laterality Date    Colonoscopy N/A 10/12/2021    Procedure: COLONOSCOPY;  Surgeon: Eugene Leslie MD;  Location: [facility] ENDOSCOPY    Hernia surgery  2010    umbilical      Family History   Problem Relation Age of Onset    Prostate Cancer Father 78    Hypertension Mother       Social History:   Social History     Socioeconomic History    Marital status:    Tobacco Use    Smoking status: Never     Passive exposure: Never    Smokeless tobacco: Never   Vaping Use    Vaping status: Never Used   Substance and Sexual Activity    Alcohol use: Yes     Comment: 1 beer, monthly    Drug use: No   Other Topics Concern    Caffeine Concern Yes     Comment: coffee, 2 -3 cups        Immunizations:   Immunization History   Administered  Date(s) Administered    Influenza 11/01/2017    TDAP 06/29/2020   Pended Date(s) Pended    Zoster Vaccine Recombinant Adjuvanted (Shingrix) 12/08/2022       Medications (Active prior to today's visit):  Current Outpatient Medications   Medication Sig Dispense Refill    ciprofloxacin 250 MG Oral Tab Take 1 tablet (250 mg total) by mouth 2 (two) times daily for 10 days. 20 tablet 0    metRONIDAZOLE 250 MG Oral Tab Take 1 tablet (250 mg total) by mouth 3 (three) times daily for 10 days. 30 tablet 0    ergocalciferol 1.25 MG (16830 UT) Oral Cap Take 1 capsule (50,000 Units total) by mouth once a week. 12 capsule 3    Sildenafil Citrate (VIAGRA) 100 MG Oral Tab Take 1 tablet (100 mg total) by mouth as needed for Erectile Dysfunction. 24 tablet 3    tamsulosin 0.4 MG Oral Cap Take 1 capsule (0.4 mg total) by mouth every morning. 90 capsule 3    atorvastatin 20 MG Oral Tab Take 1 tablet (20 mg total) by mouth nightly. (Patient not taking: Reported on 9/9/2024) 90 tablet 3       Allergies:  No Known Allergies      ROS:   Review of Systems   Constitutional:  Negative for appetite change, fatigue and fever.   Respiratory:  Negative for shortness of breath.    Cardiovascular:  Negative for chest pain.   Gastrointestinal:  Positive for abdominal pain (LLQ) and constipation. Negative for diarrhea, nausea and vomiting.   Genitourinary:  Negative for dysuria and frequency.   Musculoskeletal:  Negative for myalgias.   Skin:  Negative for rash.   Neurological:  Negative for dizziness, weakness and headaches.       PHYSICAL EXAM:   VS: /76 (BP Location: Right arm, Patient Position: Sitting, Cuff Size: adult)   Pulse 56   Ht 5' 8\" (1.727 m)   Wt 171 lb (77.6 kg)   BMI 26.00 kg/m²     Physical Exam  Vitals reviewed.   Constitutional:       Appearance: Normal appearance. He is well-developed.   HENT:      Head: Normocephalic.   Eyes:      General: No scleral icterus.     Conjunctiva/sclera: Conjunctivae normal.    Cardiovascular:      Rate and Rhythm: Normal rate.   Pulmonary:      Effort: Pulmonary effort is normal.   Abdominal:      General: Bowel sounds are normal.      Palpations: Abdomen is soft. There is no hepatomegaly or mass.      Tenderness: There is abdominal tenderness in the left upper quadrant and left lower quadrant.   Musculoskeletal:      Cervical back: Neck supple.   Skin:     Findings: No rash.   Psychiatric:         Mood and Affect: Mood normal.       LABORATORY RESULTS:       EKG / Spirometry : -     Radiology: No results found.     ASSESSMENT/PLAN:   Assessment   Encounter Diagnosis   Name Primary?    Diverticulitis Yes       MEDICATIONS:     Requested Prescriptions     Signed Prescriptions Disp Refills    ciprofloxacin 250 MG Oral Tab 20 tablet 0     Sig: Take 1 tablet (250 mg total) by mouth 2 (two) times daily for 10 days.    metRONIDAZOLE 250 MG Oral Tab 30 tablet 0     Sig: Take 1 tablet (250 mg total) by mouth 3 (three) times daily for 10 days.      REFERRALS: CT ABDOMEN+PELVIS(CPT=74176),       Procedures    CT ABDOMEN+PELVIS(CPT=74176)   Labs:  CMP       RECOMMENDATIONS given include: Patient was reassured of  his medical condition and all questions and concerns were answered. Patient was informed to please, call our office with any new or further questions or concerns that may come up in the near future. Notify Dr Sandra or the [facility] if there is a deterioration or worsening of the medical condition. Also, inform the doctor with any new symptoms or medications' side effects.  No seeds diet, high fiber diet.     FOLLOW-UP: Schedule a follow-up visit in 3 weeks / prn.            Orders This Visit:  Orders Placed This Encounter   Procedures    Comp Metabolic Panel (14)       Meds This Visit:  Requested Prescriptions     Signed Prescriptions Disp Refills    ciprofloxacin 250 MG Oral Tab 20 tablet 0     Sig: Take 1 tablet (250 mg total) by mouth 2 (two) times daily for 10 days.     metRONIDAZOLE 250 MG Oral Tab 30 tablet 0     Sig: Take 1 tablet (250 mg total) by mouth 3 (three) times daily for 10 days.       Imaging & Referrals:  CT ABDOMEN+PELVIS(CPT=74176)         ASAD DHILLON MD

## 2024-09-20 ENCOUNTER — HOSPITAL ENCOUNTER (OUTPATIENT)
Dept: CT IMAGING | Age: 64
Discharge: HOME | End: 2024-09-20
Attending: FAMILY MEDICINE
Payer: COMMERCIAL

## 2024-09-20 DIAGNOSIS — K57.92 DIVERTICULITIS: ICD-10-CM

## 2024-09-20 PROCEDURE — 74176 CT ABD & PELVIS W/O CONTRAST: CPT | Performed by: FAMILY MEDICINE

## 2024-09-23 NOTE — TELEPHONE ENCOUNTER
Pharmacy requesting refill     tamsulosin 0.4 MG Oral Cap Take 1 capsule (0.4 mg total) by mouth every morning. 90 capsule 3

## 2024-09-27 RX ORDER — TAMSULOSIN HYDROCHLORIDE 0.4 MG/1
0.4 CAPSULE ORAL EVERY MORNING
Qty: 90 CAPSULE | Refills: 3 | Status: SHIPPED | OUTPATIENT
Start: 2024-09-27

## 2024-09-27 RX ORDER — TAMSULOSIN HYDROCHLORIDE 0.4 MG/1
0.4 CAPSULE ORAL EVERY MORNING
Qty: 90 CAPSULE | Refills: 3 | OUTPATIENT
Start: 2024-09-27

## 2024-10-01 ENCOUNTER — TELEPHONE (OUTPATIENT)
Dept: FAMILY MEDICINE CLINIC | Facility: CLINIC | Age: 64
End: 2024-10-01

## 2024-10-01 NOTE — TELEPHONE ENCOUNTER
Patient's daughter Gabi called (on Release of Information), verified patient's Name and . Following up on patient's CT result, states they never heard back from the provider. Relayed Dr. Sandra' message below, verbalized understanding. Appointment scheduled for followup.    CT ABDOMEN+PELVIS(CPT=74176): Result Notes     Stephen Sandra MD  2024  7:45 PM CDT       Results reviewed. Will discuss at next office visit.     Future Appointments   Date Time Provider Department Center   10/15/2024 11:15 AM Stephen Sandra MD [facility]

## 2024-10-15 ENCOUNTER — OFFICE VISIT (OUTPATIENT)
Dept: FAMILY MEDICINE CLINIC | Facility: CLINIC | Age: 64
End: 2024-10-15

## 2024-10-15 VITALS
HEART RATE: 53 BPM | BODY MASS INDEX: 26.83 KG/M2 | SYSTOLIC BLOOD PRESSURE: 137 MMHG | WEIGHT: 177 LBS | HEIGHT: 68 IN | DIASTOLIC BLOOD PRESSURE: 74 MMHG

## 2024-10-15 DIAGNOSIS — F40.228 AEROPHOBIA: ICD-10-CM

## 2024-10-15 DIAGNOSIS — R10.32 LEFT LOWER QUADRANT ABDOMINAL PAIN: Primary | ICD-10-CM

## 2024-10-15 DIAGNOSIS — K64.1 GRADE II HEMORRHOIDS: ICD-10-CM

## 2024-10-15 PROCEDURE — 3008F BODY MASS INDEX DOCD: CPT | Performed by: FAMILY MEDICINE

## 2024-10-15 PROCEDURE — 3078F DIAST BP <80 MM HG: CPT | Performed by: FAMILY MEDICINE

## 2024-10-15 PROCEDURE — 3075F SYST BP GE 130 - 139MM HG: CPT | Performed by: FAMILY MEDICINE

## 2024-10-15 PROCEDURE — 99213 OFFICE O/P EST LOW 20 MIN: CPT | Performed by: FAMILY MEDICINE

## 2024-10-15 RX ORDER — HYDROCORTISONE 25 MG/G
1 CREAM TOPICAL 2 TIMES DAILY
Qty: 28 G | Refills: 1 | Status: SHIPPED | OUTPATIENT
Start: 2024-10-15

## 2024-10-15 RX ORDER — ALPRAZOLAM 0.5 MG
0.5 TABLET ORAL EVERY 6 HOURS PRN
Qty: 10 TABLET | Refills: 0 | Status: SHIPPED | OUTPATIENT
Start: 2024-10-15

## 2024-10-15 NOTE — PROGRESS NOTES
10/15/2024  11:38 AM    Clifford Rubin is a 64 year old male.    Chief complaint(s):   Chief Complaint   Patient presents with    Test Results     Discuss CT scan     Anxiety     C/o unable to fly without feeling anxious planing to travel in the next couple months requesting assistance     Rectal Pain     Pt c/o feeling rectal pain when he sits for long periods      HPI:     Clifford Rubin primary complaint is regarding multiple complaints.     Patient is a 64-year-old male who presents for  a follow up complaining of left abdominal pain. Symptoms resolved .Tends to be worse in the left lower quadrant but also on the left upper quadrant.  He was treated with medications in [place] for \"inflammation.  \"Reports that abdominal pain is improved but not resolved.  Initially had some diarrhea that resolved.  Now complaining of constipation.  There is no blood in the stool, fever, nausea, vomiting.  Has never had this in the past.  Colonoscopy done in 2021 was essentially normal.  Recent CT scan of the abdomen was essentially benign.  He is feeling much better now.  In addition he is complaining of having episodes of rectal pain not associate with any hematochezia.  In the past Anusol HC cream has helped and requests refill on the medication.    Furthermore he is complaining of having anxiety episodes primarily when he is flying.  Will like to get a prescription of the benzodiazepine which has helped in the past.        HISTORY:  Past Medical History:    Arthritis    Cross eyed    Dyslipidemia    Extrinsic asthma, unspecified    High cholesterol    PSVT (paroxysmal supraventricular tachycardia) (HCC)      Past Surgical History:   Procedure Laterality Date    Colonoscopy N/A 10/12/2021    Procedure: COLONOSCOPY;  Surgeon: Eugene Leslie MD;  Location: [facility] ENDOSCOPY    Hernia surgery  2010    umbilical      Family History   Problem Relation Age of Onset    Prostate Cancer Father 78    Hypertension Mother        Social History:   Social History     Socioeconomic History    Marital status:    Tobacco Use    Smoking status: Never     Passive exposure: Never    Smokeless tobacco: Never   Vaping Use    Vaping status: Never Used   Substance and Sexual Activity    Alcohol use: Yes     Comment: 1 beer, monthly    Drug use: No   Other Topics Concern    Caffeine Concern Yes     Comment: coffee, 2 -3 cups        Immunizations:   Immunization History   Administered Date(s) Administered    Influenza 11/01/2017    TDAP 06/29/2020   Pended Date(s) Pended    Zoster Vaccine Recombinant Adjuvanted (Shingrix) 12/08/2022       Medications (Active prior to today's visit):  Current Outpatient Medications   Medication Sig Dispense Refill    hydrocortisone (ANUSOL-HC) 2.5 % External Cream Place 1 Application rectally 2 (two) times daily. 28 g 1    ALPRAZolam (XANAX) 0.5 MG Oral Tab Take 1 tablet (0.5 mg total) by mouth every 6 (six) hours as needed for Anxiety (use when traveling). 10 tablet 0    tamsulosin 0.4 MG Oral Cap Take 1 capsule (0.4 mg total) by mouth every morning. 90 capsule 3    ergocalciferol 1.25 MG (10328 UT) Oral Cap Take 1 capsule (50,000 Units total) by mouth once a week. 12 capsule 3    Sildenafil Citrate (VIAGRA) 100 MG Oral Tab Take 1 tablet (100 mg total) by mouth as needed for Erectile Dysfunction. 24 tablet 3    atorvastatin 20 MG Oral Tab Take 1 tablet (20 mg total) by mouth nightly. (Patient not taking: Reported on 10/15/2024) 90 tablet 3       Allergies:  Allergies[1]      ROS:   Review of Systems   Constitutional:  Negative for appetite change, fatigue and fever.   Respiratory:  Positive for wheezing. Negative for shortness of breath.    Cardiovascular:  Negative for chest pain.   Gastrointestinal:  Positive for constipation and rectal pain. Negative for abdominal pain, anal bleeding, diarrhea, nausea and vomiting.   Musculoskeletal:  Negative for myalgias.   Skin:  Negative for rash.   Neurological:   Negative for dizziness, weakness and headaches.   Psychiatric/Behavioral:  Positive for agitation.        PHYSICAL EXAM:   VS: /74 (BP Location: Right arm, Patient Position: Sitting, Cuff Size: adult)   Pulse 53   Ht 5' 8\" (1.727 m)   Wt 177 lb (80.3 kg)   BMI 26.91 kg/m²     Physical Exam  Vitals reviewed.   Constitutional:       Appearance: Normal appearance. He is well-developed.   HENT:      Head: Normocephalic.   Eyes:      General: No scleral icterus.     Conjunctiva/sclera: Conjunctivae normal.   Cardiovascular:      Rate and Rhythm: Normal rate.   Pulmonary:      Effort: Pulmonary effort is normal.   Musculoskeletal:      Cervical back: Neck supple.   Skin:     Findings: No rash.   Psychiatric:         Mood and Affect: Mood normal.         LABORATORY RESULTS:     EKG / Spirometry : -     Radiology: CT ABDOMEN+PELVIS(CPT=74176)    Result Date: 9/20/2024  PROCEDURE:   CT ABDOMEN+PELVIS(CPT=74176)  COMPARISON:   [facility], CT APPENDIX ABD PEL W CONTRAST (KUC=[id number]), 11/26/2018, 5:53 PM.  INDICATIONS:   K57.92 Diverticulitis  TECHNIQUE: CT images of the abdomen and pelvis were obtained without intravenous contrast material.  Automated exposure control for dose reduction was used. Adjustment of the mA and/or kV was done based on the patient's size. Use of iterative reconstruction technique for dose reduction was used.  Dose information is transmitted to the ACR (American College of Radiology) NRDR (National Radiology Data Registry) which includes the Dose Index Registry.  FINDINGS:  Normal lower chest.  Coronary arteries are not visible  Normal liver and gallbladder  Normal pancreas and spleen  Normal adrenals and kidneys  Normal size aorta.  No adenopathy or ascites  Unobstructed bowel.  Moderate diffuse colonic stool.  No bowel inflammation.  No appreciable colonic diverticulosis.  Likely identification of a tiny normal appendix  Stable mild stranding and subcentimeter lymph nodes in  the central mesentery consistent with incidental benign mesenteric panniculitis  Normal urinary bladder  There are no bone lesions or fractures         CONCLUSION: No acute or concerning findings    Dictated by (CST): Kiran Waters MD on 9/20/2024 at 7:01 PM     Finalized by (CST): Kiran Waters MD on 9/20/2024 at 7:03 PM             ASSESSMENT/PLAN:   Assessment   Encounter Diagnoses   Name Primary?    Left lower quadrant abdominal pain Yes    Aerophobia     Grade II hemorrhoids        1. Left lower quadrant abdominal pain  Doing well  Resolved  RTC prn    2. Aerophobia  Refills:   - ALPRAZolam (XANAX) 0.5 MG Oral Tab; Take 1 tablet (0.5 mg total) by mouth every 6 (six) hours as needed for Anxiety (use when traveling).  Dispense: 10 tablet; Refill: 0  Follow up KPA    3. Grade II hemorrhoids  Refills:   - hydrocortisone (ANUSOL-HC) 2.5 % External Cream; Place 1 Application rectally 2 (two) times daily.  Dispense: 28 g; Refill: 1          Orders This Visit:  No orders of the defined types were placed in this encounter.      Meds This Visit:  Requested Prescriptions     Signed Prescriptions Disp Refills    hydrocortisone (ANUSOL-HC) 2.5 % External Cream 28 g 1     Sig: Place 1 Application rectally 2 (two) times daily.    ALPRAZolam (XANAX) 0.5 MG Oral Tab 10 tablet 0     Sig: Take 1 tablet (0.5 mg total) by mouth every 6 (six) hours as needed for Anxiety (use when traveling).       Imaging & Referrals:  None         ASAD DHILLON MD         [1] No Known Allergies

## 2024-10-18 DIAGNOSIS — N52.9 ERECTILE DYSFUNCTION, UNSPECIFIED ERECTILE DYSFUNCTION TYPE: ICD-10-CM

## 2024-10-21 DIAGNOSIS — N52.9 ERECTILE DYSFUNCTION, UNSPECIFIED ERECTILE DYSFUNCTION TYPE: ICD-10-CM

## 2024-10-21 RX ORDER — SILDENAFIL 100 MG/1
100 TABLET, FILM COATED ORAL AS NEEDED
Qty: 24 TABLET | Refills: 3 | Status: SHIPPED | OUTPATIENT
Start: 2024-10-21

## 2024-10-21 RX ORDER — SILDENAFIL 100 MG/1
100 TABLET, FILM COATED ORAL AS NEEDED
Qty: 24 TABLET | Refills: 3 | Status: SHIPPED | OUTPATIENT
Start: 2024-10-21 | End: 2024-10-21

## 2024-10-21 RX ORDER — SILDENAFIL 100 MG/1
100 TABLET, FILM COATED ORAL AS NEEDED
Qty: 24 TABLET | Refills: 3 | OUTPATIENT
Start: 2024-10-21

## 2024-10-21 NOTE — TELEPHONE ENCOUNTER
Signed under Odessa Corado in Error  Cancelled from pharmacy **E prescribed in error**  Resent signed under Dr. Sandra    Requested Prescriptions   Pending Prescriptions Disp Refills    SILDENAFIL CITRATE 100 MG Oral Tab [Pharmacy Med Name: SILDENAFIL 100MG TABLETS] 24 tablet 3     Sig: TAKE ONE TABLET BY MOUTH AS NEEDED FOR ERECTILE DYSFUNCTION       Genitourinary Medications Passed - 10/21/2024 12:39 PM        Passed - Patient does not have pulmonary hypertension on problem list        Passed - In person appointment or virtual visit in the past 12 mos or appointment in next 3 mos     Recent Outpatient Visits              6 days ago Left lower quadrant abdominal pain    [facility], Stephen Burnett MD    Office Visit    1 month ago Diverticulitis    [facility]    Office Visit    10 months ago Physical exam    [facility]    Office Visit    1 year ago Chronic pain of right knee    [facility]    Office Visit    1 year ago Physical exam    [facility], Stephen Burnett MD    Office Visit                               Recent Outpatient Visits              6 days ago Left lower quadrant abdominal pain    [facility], Stephen Burnett MD    Office Visit    1 month ago Diverticulitis    [facility], Stephen Burnett MD    Office Visit    10 months ago Physical exam    [facility]    Office Visit    1 year ago Chronic pain of right knee    [facility]    Office Visit    1 year ago [facility] exam    [facility]    Office Visit

## 2025-01-21 ENCOUNTER — OFFICE VISIT (OUTPATIENT)
Dept: FAMILY MEDICINE CLINIC | Facility: CLINIC | Age: 65
End: 2025-01-21

## 2025-01-21 VITALS
HEART RATE: 62 BPM | HEIGHT: 68 IN | BODY MASS INDEX: 27.28 KG/M2 | DIASTOLIC BLOOD PRESSURE: 65 MMHG | WEIGHT: 180 LBS | SYSTOLIC BLOOD PRESSURE: 113 MMHG

## 2025-01-21 DIAGNOSIS — R35.0 BENIGN PROSTATIC HYPERPLASIA WITH URINARY FREQUENCY: ICD-10-CM

## 2025-01-21 DIAGNOSIS — N40.1 BENIGN PROSTATIC HYPERPLASIA WITH URINARY FREQUENCY: ICD-10-CM

## 2025-01-21 DIAGNOSIS — R10.2 SUPRAPUBIC ABDOMINAL PAIN: Primary | ICD-10-CM

## 2025-01-21 LAB
APPEARANCE: CLEAR
BILIRUBIN: NEGATIVE
GLUCOSE (URINE DIPSTICK): NEGATIVE MG/DL
KETONES (URINE DIPSTICK): NEGATIVE MG/DL
LEUKOCYTES: NEGATIVE
MULTISTIX EXPIRATION DATE: NORMAL DATE
MULTISTIX LOT#: NORMAL NUMERIC
NITRITE, URINE: NEGATIVE
OCCULT BLOOD: NEGATIVE
PH, URINE: 5.5 (ref 4.5–8)
PROTEIN (URINE DIPSTICK): NEGATIVE MG/DL
SPECIFIC GRAVITY: 1.01 (ref 1–1.03)
URINE-COLOR: YELLOW
UROBILINOGEN,SEMI-QN: 0.2 MG/DL (ref 0–1.9)

## 2025-01-21 PROCEDURE — 81003 URINALYSIS AUTO W/O SCOPE: CPT | Performed by: FAMILY MEDICINE

## 2025-01-21 PROCEDURE — 99213 OFFICE O/P EST LOW 20 MIN: CPT | Performed by: FAMILY MEDICINE

## 2025-01-21 RX ORDER — TAMSULOSIN HYDROCHLORIDE 0.4 MG/1
0.4 CAPSULE ORAL EVERY MORNING
Qty: 90 CAPSULE | Refills: 3 | Status: SHIPPED | OUTPATIENT
Start: 2025-01-21

## 2025-01-21 NOTE — PROGRESS NOTES
1/21/2025  2:50 PM    Clifford Rubin is a 64 year old male.    Chief complaint(s):   Chief Complaint   Patient presents with    Abdominal Pain     States that he had lower abdomen pain, urine frequency. Denies any burning      HPI:     Clifford Rubin primary complaint is regarding as above.     Patient 64 year old male presents with suprapubic abdominal pain without radiation . Clifford Rubin symptoms started 1 month ago and is described as burning and is rated as moderate in intensity.  Pain onset was gradual starting 1 month ago and since the onset it has  comes and goes  .   Associated symptoms include polyuria. Negative pertinent include constipation, diarrhea, nausea, vomiting, and dysuria . Symptoms are aggravated by nothing. Symptoms improve with nothing. Pertinent past medical history includes BPH.        HISTORY:  Past Medical History:    Arthritis    Cross eyed    Dyslipidemia    Extrinsic asthma, unspecified    High cholesterol    PSVT (paroxysmal supraventricular tachycardia) (HCC)      Past Surgical History:   Procedure Laterality Date    Colonoscopy N/A 10/12/2021    Procedure: COLONOSCOPY;  Surgeon: Eugene Leslie MD;  Location: [facility] ENDOSCOPY    Hernia surgery  2010    umbilical      Family History   Problem Relation Age of Onset    Prostate Cancer Father 78    Hypertension Mother       Social History:   Social History     Socioeconomic History    Marital status:    Tobacco Use    Smoking status: Never     Passive exposure: Never    Smokeless tobacco: Never   Vaping Use    Vaping status: Never Used   Substance and Sexual Activity    Alcohol use: Yes     Comment: 1 beer, monthly    Drug use: No   Other Topics Concern    Caffeine Concern Yes     Comment: coffee, 2 -3 cups        Immunizations:   Immunization History   Administered Date(s) Administered    Influenza 11/01/2017    TDAP 06/29/2020   Pended Date(s) Pended    Zoster Vaccine Recombinant Adjuvanted (Shingrix) 12/08/2022        Medications (Active prior to today's visit):  Current Outpatient Medications   Medication Sig Dispense Refill    tamsulosin 0.4 MG Oral Cap Take 1 capsule (0.4 mg total) by mouth every morning. 90 capsule 3    Sildenafil Citrate 100 MG Oral Tab Take 1 tablet (100 mg total) by mouth as needed for Erectile Dysfunction. 24 tablet 3    hydrocortisone (ANUSOL-HC) 2.5 % External Cream Place 1 Application rectally 2 (two) times daily. 28 g 1    ALPRAZolam (XANAX) 0.5 MG Oral Tab Take 1 tablet (0.5 mg total) by mouth every 6 (six) hours as needed for Anxiety (use when traveling). 10 tablet 0    ergocalciferol 1.25 MG (58733 UT) Oral Cap Take 1 capsule (50,000 Units total) by mouth once a week. 12 capsule 3    atorvastatin 20 MG Oral Tab Take 1 tablet (20 mg total) by mouth nightly. (Patient not taking: Reported on 1/21/2025) 90 tablet 3       Allergies:  Allergies[1]      ROS:   Review of Systems   Constitutional:  Negative for appetite change, fatigue and fever.   Respiratory:  Negative for shortness of breath.    Cardiovascular:  Negative for chest pain.   Gastrointestinal:  Positive for abdominal pain (supra pubic).   Endocrine: Positive for polyuria.   Genitourinary:  Positive for dysuria. Negative for hematuria.   Musculoskeletal:  Negative for myalgias.   Skin:  Negative for rash.   Neurological:  Negative for dizziness, weakness and headaches.       PHYSICAL EXAM:   VS: /65 (BP Location: Right arm, Patient Position: Sitting, Cuff Size: adult)   Pulse 62   Ht 5' 8\" (1.727 m)   Wt 180 lb (81.6 kg)   BMI 27.37 kg/m²     Physical Exam  Vitals reviewed.   Constitutional:       Appearance: Normal appearance. He is well-developed.   HENT:      Head: Normocephalic.   Eyes:      General: No scleral icterus.     Conjunctiva/sclera: Conjunctivae normal.   Cardiovascular:      Rate and Rhythm: Normal rate.   Pulmonary:      Effort: Pulmonary effort is normal.   Abdominal:      General: Bowel sounds are normal.       Tenderness: There is no abdominal tenderness. There is no right CVA tenderness or left CVA tenderness.   Genitourinary:     Prostate: Enlarged (mild). Not tender.      Rectum: No tenderness.   Musculoskeletal:      Cervical back: Neck supple.   Skin:     Findings: No rash.   Psychiatric:         Mood and Affect: Mood normal.         LABORATORY RESULTS:   No results found for: \"URCOLOR\", \"URCLA\", \"URINELEUK\", \"URINENITRITE\", \"URINEBLOOD\"   Results for orders placed or performed in visit on 01/21/25   URINALYSIS, AUTO, W/O SCOPE    Collection Time: 01/21/25  3:02 PM   Result Value Ref Range    Glucose Urine Negative Negative mg/dL    Bilirubin Urine Negative Negative    Ketones, UA Negative Negative - Trace mg/dL    Spec Gravity 1.015 1.005 - 1.030    Blood Urine Negative Negative    PH Urine 5.5 5.0 - 8.0    Protein Urine Negative Negative - Trace mg/dL    Urobilinogen Urine 0.2 0.2 - 1.0 mg/dL    Nitrite Urine Negative Negative    Leukocyte Esterase Urine Negative Negative    APPEARANCE CLEAR Clear    Color Urine YELLOW Yellow    Multistix Lot# [id number] Numeric    Multistix Expiration Date 103,125 Date     EKG / Spirometry : -     Radiology: No results found.     ASSESSMENT/PLAN:   Assessment   Encounter Diagnoses   Name Primary?    Suprapubic abdominal pain Yes    Benign prostatic hyperplasia with urinary frequency        MEDICATIONS:     Requested Prescriptions     Signed Prescriptions Disp Refills    tamsulosin 0.4 MG Oral Cap 90 capsule 3     Sig: Take 1 capsule (0.4 mg total) by mouth every morning.           LABORATORY & ORDERS:   Orders Placed This Encounter   Procedures    URINALYSIS, AUTO, W/O SCOPE    PSA, Total W Reflex To Free   RECOMMENDATIONS given include: Patient was reassured of  his medical condition and all questions and concerns were answered. Patient was informed to please, call our office with any new or further questions or concerns that may come up in the near future. Notify Dr Sandra or  the [facility] if there is a deterioration or worsening of the medical condition. Also, inform the doctor with any new symptoms or medications' side effects.      FOLLOW-UP: Schedule a follow-up visit in  prn.            Orders This Visit:  Orders Placed This Encounter   Procedures    URINALYSIS, AUTO, W/O SCOPE    PSA, Total W Reflex To Free       Meds This Visit:  Requested Prescriptions     Signed Prescriptions Disp Refills    tamsulosin 0.4 MG Oral Cap 90 capsule 3     Sig: Take 1 capsule (0.4 mg total) by mouth every morning.       Imaging & Referrals:  None         ASDA DHILLON MD         [1] No Known Allergies

## 2025-01-23 LAB — TOTAL PSA: 1.1 NG/ML

## 2025-01-30 ENCOUNTER — MED REC SCAN ONLY (OUTPATIENT)
Dept: FAMILY MEDICINE CLINIC | Facility: CLINIC | Age: 65
End: 2025-01-30

## 2025-04-22 ENCOUNTER — TELEPHONE (OUTPATIENT)
Dept: FAMILY MEDICINE CLINIC | Facility: CLINIC | Age: 65
End: 2025-04-22

## 2025-06-06 ENCOUNTER — TELEPHONE (OUTPATIENT)
Dept: FAMILY MEDICINE CLINIC | Facility: CLINIC | Age: 65
End: 2025-06-06

## (undated) DEVICE — KIT CLEAN ENDOKIT 1.1OZ GOWNX2

## (undated) DEVICE — Device: Brand: DEFENDO AIR/WATER/SUCTION AND BIOPSY VALVE

## (undated) DEVICE — LINE MNTR ADLT SET O2 INTMD

## (undated) DEVICE — MEDI-VAC NON-CONDUCTIVE SUCTION TUBING 6MM X 1.8M (6FT.) L: Brand: CARDINAL HEALTH

## (undated) DEVICE — 35 ML SYRINGE REGULAR TIP: Brand: MONOJECT

## (undated) NOTE — LETTER
11/04/21    [address] [place] [address] [address]      Dear Steffany Gonzalez,    [facility] records indicate that you have outstanding lab work and or testing that was ordered for you and has not yet been completed:  Orders Placed This Encounter      C

## (undated) NOTE — LETTER
02/02/21      [facility]      Dear Vikas Hand,    [facility] records indicate that you have outstanding lab work and or testing that was ordered for you and has not yet been completed:  Orders Placed This Encounter      Comp Metab

## (undated) NOTE — LETTER
3/7/2024              Clifford Rubin        [address] [place] [id number]

## (undated) NOTE — ED AVS SNAPSHOT
[facility]    [address]     [address] [place] [address]    Phone:  [phone]    Fax:  [phone]   MRN: [id number]    Department:  [facility]   Date of Visit:  1/ and Class Registration line at [phone] or find a doctor online by visiting [email or web address].    IF THERE IS ANY CHANGE OR WORSENING OF YOUR CONDITION, CALL YOUR PRIMARY CARE PHYSICIAN AT ONCE OR RETURN IMMEDIATELY TO [address] [place] [address].     If

## (undated) NOTE — LETTER
05/06/21        [facility]      Dear Jessica Lamb,    [facility] records indicate that you have outstanding lab work and or testing that was ordered for you and has not yet been completed:  Orders Placed This Encounter      Comp M

## (undated) NOTE — LETTER
November 25, 2017    [address] [place] [address]      Dear Ree Lion: The following are the results of your recent tests ordered by [facility]. Please review the list of test results.   Your result is the number on the left;

## (undated) NOTE — LETTER
07/29/20        [address]      Dear Nara Lorenz,    [facility] records indicate that you have outstanding lab work and or testing that was ordered for you and has not yet been completed:  Orders Placed This Encounter      Comp M

## (undated) NOTE — LETTER
[facility]  Administration of Anesthesia  1. Socorro Villasenor, or _________________________________ acting on his behalf, (Patient) (Dependent/Representative) request to receive anesthesia for my pending procedure/operation/treatment. bleeding, seizure, cardiac arrest and death. 7. AWARENESS: I understand that it is possible (but unlikely) to have explicit memory of events from the operating room while under general anesthesia.   8. ELECTROCONVULSIVE THERAPY PATIENTS: This consent serve below affirms that prior to the time of the procedure, I have explained to the patient and/or his/her guardian, the risks and benefits of undergoing anesthesia, as well as any reasonable alternatives.     ___________________________________________________

## (undated) NOTE — LETTER
11/26/2018              [address] [place] [address]         Patient  Recommended light duty for 2 weeks/ no lifting more than 5 lbs at a time,  Avoid frequent twisting and bending/. .      Sincerely,    Blake Duffy,

## (undated) NOTE — LETTER
[address], [place]  Authorization for Invasive Procedures  1.  I hereby authorize Dr. Moira Arnold , my physician and whomever may be designated as the doctor's assistant, to perform the following operation and/or procedur fever and allergic reactions, hemolytic reactions, transmission of disease such as hepatitis, AIDS, cytomegalovirus (CMV), and flluid overload.  In the event that I wish to have autologous transfusions of my own blood, or a directed donor transfusion, I eric Signature of Patient:  ________________________________________________ Date: _________Time: _________    Responsible person in case of minor or unconscious: _____________________________Relationship: ____________     Witness Signature: _______________

## (undated) NOTE — LETTER
[address], [place]  Authorization for Invasive Procedures  1.  I hereby authorize Dr. Torri Pan , my physician and whomever may be designated as the doctor's assistant, to perform the following operation and/or procedure:  Electrophys 5. I consent to the photographing of the operations or procedures to be performed for the purposes of advancing medicine, science, and/or education, provided my identity is not revealed.  If the procedure has been videotaped, the physician/surgeon will obta __________ Time: ___________    Statement of Physician  My signature below affirms that prior to the time of the procedure, I have explained to the patient and/or his legal representative, the risks and benefits involved in the proposed treatment and any r

## (undated) NOTE — LETTER
11/02/20        [facility]      Dear Juliann Doll,    [facility] records indicate that you have outstanding lab work and or testing that was ordered for you and has not yet been completed:  Orders Placed This Encounter      Comp M

## (undated) NOTE — ED AVS SNAPSHOT
Glo Banerjee   MRN: [id number]    Department:  [address] [place] [address] Emergency Department   Date of Visit:  8/4/2017           Disclosure     Insurance plans vary and the physician(s) referred by the ER may not be covered by your plan.  Please contact CARE PHYSICIAN AT ONCE OR RETURN IMMEDIATELY TO THE EMERGENCY DEPARTMENT. If you have been prescribed any medication(s), please fill your prescription right away and begin taking the medication(s) as directed.   If you believe that any of the medications